# Patient Record
Sex: FEMALE | Race: WHITE | Employment: FULL TIME | URBAN - METROPOLITAN AREA
[De-identification: names, ages, dates, MRNs, and addresses within clinical notes are randomized per-mention and may not be internally consistent; named-entity substitution may affect disease eponyms.]

---

## 2022-03-24 PROBLEM — E03.9 HYPOTHYROIDISM AFFECTING PREGNANCY IN THIRD TRIMESTER: Status: ACTIVE | Noted: 2022-03-24

## 2022-03-24 PROBLEM — Z37.9 NORMAL LABOR: Status: ACTIVE | Noted: 2022-03-24

## 2022-03-24 PROBLEM — O09.523 ADVANCED MATERNAL AGE IN MULTIGRAVIDA, THIRD TRIMESTER: Status: ACTIVE | Noted: 2022-03-24

## 2022-03-24 PROBLEM — O99.283 HYPOTHYROIDISM AFFECTING PREGNANCY IN THIRD TRIMESTER: Status: ACTIVE | Noted: 2022-03-24

## 2022-10-26 ENCOUNTER — HOSPITAL ENCOUNTER (OUTPATIENT)
Age: 37
Setting detail: OUTPATIENT SURGERY
Discharge: HOME | End: 2022-10-26
Attending: INTERNAL MEDICINE | Admitting: INTERNAL MEDICINE
Payer: COMMERCIAL

## 2022-10-26 VITALS
TEMPERATURE: 97.1 F | HEART RATE: 68 BPM | SYSTOLIC BLOOD PRESSURE: 98 MMHG | OXYGEN SATURATION: 96 % | RESPIRATION RATE: 18 BRPM | DIASTOLIC BLOOD PRESSURE: 60 MMHG

## 2022-10-26 PROCEDURE — 76040000007: Performed by: INTERNAL MEDICINE

## 2022-10-26 PROCEDURE — 2709999900 HC NON-CHARGEABLE SUPPLY: Performed by: INTERNAL MEDICINE

## 2022-10-26 NOTE — H&P
History and Physical reviewed; I have examined the patient and there are no pertinent changes. Carver Frankel, MD, MD   2:21 PM 10/26/2022  [facility], [address] [place] [address]  [email or web address] [email or web address] [facility]

## 2022-11-07 NOTE — PROCEDURES
HIGH RESOLUTION ANORECTAL MANOMETRY REPORT      Indication:   1. Constipation K 59.0    Date of procedure: 10/26/2022    Procedure:     After confirmation of potential allergies, the anorectal manometry probe was introduced through the anus. Pressure bands were observed on the color contour. Patient was assisted to the coirrect position and catheter was stabilized by taping to the perianal skin. Patient was encouraged to relax while acclimating to catheter for a few minutes. Standard protocol for the procedure was followed. At the conclusion of the procedure; the catheter was removed. Findings:    1. Resting pressure: Resting pressure was normal at 58 mm Hg (normal is  mm Hg)    2. Squeeze pressure: There was adequate rise in pressures with attempted squeeze. Duration of sustained squeeze is sustained (normal response is a rise to  ~ 100 above baseline)    3. Bear down: Dyssynergic defecation was noted on both attempts with a less than 20% drop in anal pressures. Rectal pressures were adequate. 4. RAIR (Recto-anal inhibitory reflex): present. 5. Rectal sensation: normal rectal sensation (first sensation at 60 mm Hg, urge to defecate at 60 Hg and discomfort at 100 Hg) was observed. Impression:    1. Dyssynergic defecation. 2. Otherwise normal study. Recommend:    1. Consider biofeedback therapy. Moises Rivera MD  [facility].  [email or web address]  Phone: [phone]  Pager: [phone]  Cell: [phone]. [email or web address]

## 2022-11-15 ENCOUNTER — TRANSCRIBE ORDER (OUTPATIENT)
Dept: SCHEDULING | Age: 37
End: 2022-11-15

## 2022-11-30 ENCOUNTER — TRANSCRIBE ORDER (OUTPATIENT)
Dept: SCHEDULING | Age: 37
End: 2022-11-30

## 2022-11-30 DIAGNOSIS — K59.00 CONSTIPATION: Primary | ICD-10-CM

## 2022-12-07 ENCOUNTER — HOSPITAL ENCOUNTER (OUTPATIENT)
Dept: PHYSICAL THERAPY | Age: 37
Discharge: HOME | End: 2022-12-07
Payer: COMMERCIAL

## 2022-12-07 PROCEDURE — 97530 THERAPEUTIC ACTIVITIES: CPT

## 2022-12-07 PROCEDURE — 97162 PT EVAL MOD COMPLEX 30 MIN: CPT

## 2022-12-07 NOTE — THERAPY EVALUATION
[facility] [employer]  [address] [facility]  [phone] fax    Plan of Care/ Statement of Necessity for Physical Therapy Services    Patient name: Gonzalo Polk Start of Care: 2022   Referral source: Citlali Lopez NP : 1985    Medical Diagnosis: Other specified disorders of muscle [M62.89]  Payor: BLUE CROSS / Plan:  [facility] / Product Type: PPO /  Onset Date: 2022    Treatment Diagnosis: PFD, difficulty with voiding, UI   Prior Hospitalization: see medical history Provider#: [id number]   Medications: Verified on Patient summary List    Comorbidities: scoliosis surgery, thyroid problems, alcohol use, visual impaired,    Prior Level of Function: chronic back pain, mod ind with mobility, no UI symptoms nor bowel problem           The Plan of Care and following information is based on the information from the initial evaluation. Assessment/ varela information: Ms. Kelly Otoole is a 43 y/o, F who present with c/o PFD, difficulty voiding, UI. Pt had problem with epidural & had   in 2022. Sep MRI show arachnoiditis. She had urodynamic & manometry tests; recalls PFM weakness with all tests. She's also scheduled for another pelvic floor MRI and will follow up with MD for results next week. Pt reports very mild urine leakage with coughing, less than 1x/month. Pt requires double voiding (but notes only small amount with 2nd voiding), straining significantly to void and persistent pressure/urgency with bladder. Nocturia is 3x/night. Bowel dysfunction include max straining, hemorrhoid (internal & external) and incomplete voiding. Pt sometimes experiences fecal leakage while pushing to void bladder. Pt hasn't been sexually active since ; no problem with internal assessment/Pap smear. Besides, pt has chronic LBP due to scoliosis surgery.    Evaluation reveals patient with fair strength of B obliques (worst with right side plank) & mod diastasis recti (3 finger at umbilicus but good ligamentous tension). Internal assessment reveals poor tone of PFM, fair strength and endurance (PERF score is 1+/7/4//4). Noted intact anal reflex, fairly good endurance for rectal sphincter/PFM (20 sec hold). Patient may benefit from physical therapy to address these limitations to improve her QOL. Evaluation Complexity History HIGH Complexity :3+ comorbidities / personal factors will impact the outcome/ POC ; Examination MEDIUM Complexity : 3 Standardized tests and measures addressing body structure, function, activity limitation and / or participation in recreation  ;Presentation MEDIUM Complexity : Evolving with changing characteristics  ; Clinical Decision Making MEDIUM Complexity : FOTO score of 26-74  Overall Complexity Rating: MEDIUM    Problem List: Pelvic pain/dysfunction, Decreased pelvic floor mm awareness, Decreased pelvic floor mm strength, Use of accessory muscles, Improper voiding habits, Hypertonus of pelvic floor, and Urinary urgency    Treatment Plan may include any combination of the following:   Therapeutic exercise, Urge suppression techniques, Neuromuscular re-education, Manual therapy, Physical agent/modality, and Patient education  Patient / Family readiness to learn indicated by: asking questions, trying to perform skills, and interest    Persons(s) to be included in education: patient (P)    Barriers to Learning/Limitations: None    Patient Goal (s): empty bowel & bladder better, improve strength    Patient Self Reported Health Status: good    Rehabilitation Potential: good      Short Term Goals: To be accomplished in 5  weeks:  1. Patient will perform Home Exercise Program accurately as adjunct to PT clinic visits to promote healthy lifestyle and improve quality of life. Eval status: will review next visits     2.  Patient will demonstrate accurate simulation of proper defecation dynamics with min cues for increased ease of defecation and more normal function. Eval status: will review next visits    3. Patient will report at least 25% improvement with emptying bladder & bowel to improve her QOL. Eval status: max straining, incomplete emptying, persistent pressure/urgency     Long Term Goals: To be accomplished in 8 weeks:  1. Patient demonstrate independence in HEP for maintenance of Pelvic Floor program and improved quality of life. Eval status: will review next visits     2. Patient will report at least 60% improvement with emptying bladder & bowel to improve her QOL. Eval status: max straining, incomplete emptying, persistent pressure/urgency     3. Patient will demonstrate improvement of PFM strength at least 1/5 grade to improve ease with voiding and ADLs performance. Eval status: 1+/5     4. Patient will be able to perform side plank for 30 seconds with good form indicating improved core strength for voiding & ADLs performance. Eval status: 12 sec with right plank, 16 with Left side     4. Patient will have FOTO score Urinary Problem & Bowel Constipation change of 4 & 11 points indicating improvement in function. Eval status: 79 & 45      Frequency / Duration: Patient to be seen 1-2 times per week for 8 weeks. Patient/ Caregiver education and instruction: Diagnosis, prognosis, Proper Voiding Habits, Diet, Pain Management, Exercises, and Bladder Retraining      Balaji Cruz 12/7/2022 9:37 AM    ________________________________________________________________________    I certify that the above Therapy Services are being furnished while the patient is under my care. I agree with the treatment plan and certify that this therapy is necessary.     [de-identified] Signature:____________Date:_________TIME:________     Luther Burt NP  ** Signature, Date and Time must be completed for valid certification **      Please sign and return to [facility] - [address] [facility]  [phone] fax

## 2022-12-07 NOTE — THERAPY EVALUATION
PF Daily Treatment Note  Patient Name: Sawyer Grady  Date:2022  [x]  Patient  Verified  Insurance:Payor: BLUE CROSS / Plan: [employer] [facility] / Product Type: PPO /   In time: 4:56  Out time: 6:02  Total Treatment Time (min): 66  Total Timed Codes (min): 40  1:1 Treatment Time ( only): 66   Visit #: 1 of 8-16    Treatment Area: [x] Pelvic Floor     [] Other:    SUBJECTIVE  Pain Level (0-10 scale): 2/10  Any medication changes, allergies to medications, adverse drug reactions, diagnosis change, or new procedure performed?: [x] No    [] Yes (see summary sheet for update)    Ms. Frankie Schwarz is a 45 y/o, F who present with c/o PFD, difficulty voiding, UI. Pt had problem with epidural & had   in 2022. Sep MRI show arachnoiditis. She had urodynamic & manometry tests; recalls PFM weakness with all tests. She's also scheduled for another pelvic floor MRI and will follow up with MD for results next week. Pt reports very mild urine leakage with coughing, less than 1x/month. Pt requires double voiding (but notes only small amount with 2nd voiding), straining significantly to void and persistent pressure/urgency with bladder. Nocturia is3x/night. Bowel dysfunction include max straining, hemorrhoid (internal & external) and incomplete voiding. Pt sometimes experiences fecal leakage while pushing to void bladder. Pt hasn't been sexually active since ; no problem with internal assessment/Pap smear. Besides, pt has chronic LBP due to scoliosis surgery.          Pelvic Floor Dysfunction Evaluation    Musculoskeletal Screen:    Skin Integrity:  [] Healthy [x] Red  [] Labia Atrophy [] Fragile    Sensation: [x] Intact [] Diminished:    Muscle Bulk: [x] Symmetrical  [] Well-developed [] Atrophied:  []L   []R   []B    Prolapse: Min instability with ant wall  [] Cystocele:   [] Rectocele:    PERF Score (Performance/Endurance/Repetitions/Flicks)   P: 1+ E:7 R:4 F:4 Total:  More prominent contraction of Right side than Left    Rectally: 20 sec hold  Good coordination with bulging    Patient has failed previous pelvic floor muscle training? [] Yes    [] No    EMG Evaluation:  [] N/A [] Deferred secondary to:    Channel A: Electrode type:  [] Internal    [] Surface    [] Vaginal    [] Rectal  Channel B: Electrode location:    Baseline Resting Tone (1 minute)  Channel A (microvolts): Quality:  [] Normal [] Irradic [] Elevated  Channel B (microvolts): Slow Twitch: (10 second hold, 20 second rest)  Channel A (microvolts): Quality:[] Quick/slow rise [] Low net rise  Net rise (microvolts):   [] Slow Relaxation [] Incoordination       [] Unable to contract [] Fatigues at (sec):       [] Elevated baseline between contractions    Channel B (microvolts): Use of Accessory Muscles: [] Minimal  Net rise (microvolts):   [] Moderate  [] Excessive       [] No use of accessory muscles    Fast Twitch (3 second hold, 10 second rest)  Channel A (microvolts): Quality:[] Quick/slow rise [] Low net rise  Net rise (microvolts):   [] Slow Relaxation [] Incoordination       [] Unable to contract [] Fatigues at (sec):       [] Elevated baseline between contractions    Channel B (microvolts):  Use of Accessory Muscles: [] Minimal  Net rise (microvolts):   [] Moderate  [] Excessive       [] No use of accessory muscles    Optional Tests:  Lower abdominal strength: /5    Comments/Additional Tests:      OBJECTIVE    26 min eval    40 min Therapeutic Activity:  []  See flow sheet :Pt edu within scope of practice on prognosis, POC, PFM anatomy/physiology, Pelvic Floor PT, voiding mechanics, HEP    Rationale: Increase pelvic floor muscle strength, Improve quality of pelvic floor contractions, Decrease resting tone of the pelvic floor, Increase tissue extensibility of the pelvic floor muscles, Increase core strength, Inhibit abnormal muscle activity, and Improve lumbosacral and coccygeal mobility in order to Increase urinary continence, Improve frequency and ease of bowel movements, Improve ability to perform ADLs, and voiding bladder with eaes . min Patient Education: [x] Review HEP    [] Progressed/Changed HEP based on:   [] positioning   [] body mechanics   [] transfers   [] heat/ice application        Other Objective/Functional Measures:   []baseline resting tone: []slow twitch mms   []fast twitch mms      Pain Level (0-10 scale) post treatment: 2/10    ASSESSMENT/Changes in Function: see POC please    []  Decrease # of leaks   [] No change []  Improving [] Resolved     []  Decrease hypertonus [] No change []  Improving [] Resolved     []  Increase void interval [] No change []  Improving [] Resolved     []  Increase PF strength [] No change []  Improving [] Resolved     []  Increase PF endurance [] No change []  Improving [] Resolved     []  Increase endurance [] No change []  Improving [] Resolved     []  Decrease # of pads [] No change []  Improving [] Resolved     []  Decrease pain [] No change []  Improving [] Resolved       Patient will continue to benefit from skilled PT services to modify and progress therapeutic interventions, address functional mobility deficits, address ROM deficits, address strength deficits, analyze and address soft tissue restrictions, analyze and cue movement patterns, analyze and modify body mechanics/ergonomics, assess and modify postural abnormalities, address imbalance/dizziness and instruct in home and community integration to attain remaining goals.      [x]  See Plan of Care         PLAN  [x]  Upgrade activities as tolerated     [x]  Continue plan of care  []  Update interventions per flow sheet       []  Discharge due to:_  []  Other:_        Vish Carpenter 12/7/2022  9:37 AM

## 2023-01-04 ENCOUNTER — HOSPITAL ENCOUNTER (OUTPATIENT)
Dept: PHYSICAL THERAPY | Age: 38
Discharge: HOME | End: 2023-01-04
Payer: COMMERCIAL

## 2023-01-04 PROCEDURE — 97530 THERAPEUTIC ACTIVITIES: CPT

## 2023-01-04 PROCEDURE — 97112 NEUROMUSCULAR REEDUCATION: CPT

## 2023-01-04 NOTE — PROGRESS NOTES
PF DAILY TREATMENT NOTE 3-16    Patient Name: Williams Precise  Date:2023  : 1985  [x]  Patient  Verified  Payor: BLUE CROSS / Plan: [employer] [facility] [address] / Product Type: PPO /    In time:4:02  Out time: 4:54  Total Treatment Time (min): 52  Visit #: 1 of     Medicare/BCBS Only   Total Timed Codes (min):  52 1:1 Treatment Time:  52     Treatment Area: [x] Pelvic Floor     [] Other:    SUBJECTIVE  Pain Level (0-10 scale): 2/10 back  Any medication changes, allergies to medications, adverse drug reactions, diagnosis change, or new procedure performed?: [x] No    [] Yes (see summary sheet for update)  Subjective functional status/changes:   [] No changes reported  Pt reports doing ok with all the handouts. Pt had MRI during voiding with contrast. Pt's seeing fecal matter bulging sideway instead of straight down towards the anus. No prolapse diagnosis. Her doctor will a kidney screen/blood test to make sure she's ok. But she usually drinks about a gallon of water everyday. OBJECTIVE    27 min Therapeutic Activity:  [x]  See flow sheet :    []  Increase Tissue extensibility        []  Assess fiber intake    [x]  Assess voiding habits  [x]  Assess bowel habits  []  Other:   Rationale: Increase pelvic floor muscle strength, Improve quality of pelvic floor contractions, Decrease resting tone of the pelvic floor, Increase tissue extensibility of the pelvic floor muscles, Increase core strength, Inhibit abnormal muscle activity, and Improve lumbosacral and coccygeal mobility in order to Increase urinary continence, Decrease bowel urgency, Improve frequency and ease of bowel movements, and Improve ability to perform ADLs.     25 min Neuromuscular Re-education:  []  See flow sheet :   []  Pelvic floor strengthening                 []  Pelvic floor downtraining  [x]  Quality pelvic floor contractions       []  Relaxation techniques  []  Urge suppression exercises  [x]  Other: HEP  Rationale: Increase pelvic floor muscle strength, Improve quality of pelvic floor contractions, Decrease resting tone of the pelvic floor, Increase tissue extensibility of the pelvic floor muscles, Increase core strength, Inhibit abnormal muscle activity, and Improve lumbosacral and coccygeal mobility in order to Increase urinary continence, Decrease bowel urgency, Improve frequency and ease of bowel movements, and Improve ability to perform ADLs.             With   [] TE   [] TA   [] neuro  [] manual   [] other: Patient Education: [x] Review HEP    [] Progressed/Changed HEP based on:   [] positioning   [] body mechanics   [] transfers   [] heat/ice application    [] other:      Other Objective/Functional Measures:   []baseline resting tone:   []slow twitch mms   []fast twitch mms    Pain Level (0-10 scale) post treatment: 2/10 back    ASSESSMENT/Changes in Function: see progress note please    []  Decrease # of leaks   [] No change []  Improving [] Resolved     []  Decrease hypertonus [] No change []  Improving [] Resolved     []  Increase void interval [] No change []  Improving [] Resolved     []  Increase PF strength [] No change []  Improving [] Resolved     []  Increase PF endurance [] No change []  Improving [] Resolved     []  Increase endurance [] No change []  Improving [] Resolved     []  Decrease # of pads [] No change []  Improving [] Resolved     []  Decrease pain [] No change []  Improving [] Resolved     []  Increased coordination [] No change []  Improving [] Resolved     []  Increased Bowel Frequency [] No change []  Improving [] Resolved       Patient will continue to benefit from skilled PT services to modify and progress therapeutic interventions, address functional mobility deficits, address ROM deficits, address strength deficits, analyze and address soft tissue restrictions, analyze and cue movement patterns, analyze and modify body mechanics/ergonomics, assess and modify postural abnormalities, address imbalance/dizziness, and instruct in home and community integration to attain remaining goals. [x]  See Plan of Care  [x]  See progress note/recertification  []  See Discharge Summary         Progress towards goals / Updated goals:  Short Term Goals: To be accomplished in 5  weeks:  1. Patient will perform Home Exercise Program accurately as adjunct to PT clinic visits to promote healthy lifestyle and improve quality of life. Eval status: will review next visits  Current: partial compliance 1-4-23    2. Patient will demonstrate accurate simulation of proper defecation dynamics with min cues for increased ease of defecation and more normal function. Eval status: will review next visits  Current: progressing gradually 1-4-23    3. Patient will report at least 25% improvement with emptying bladder & bowel to improve her QOL. Eval status: max straining, incomplete emptying, persistent pressure/urgency  Current: progressing well 1-4-23    Long Term Goals: To be accomplished in 8 weeks:  1. Patient demonstrate independence in HEP for maintenance of Pelvic Floor program and improved quality of life. Eval status: will review next visits  Current: partial compliance 1-4-23    2. Patient will report at least 60% improvement with emptying bladder & bowel to improve her QOL. Eval status: max straining, incomplete emptying, persistent pressure/urgency  Current: progressing gradually 1-4-23    3. Patient will demonstrate improvement of PFM strength at least 1/5 grade to improve ease with voiding and ADLs performance. Eval status: 1+/5  Current: progressing gradually 1-4-23    4. Patient will be able to perform side plank for 30 seconds with good form indicating improved core strength for voiding & ADLs performance. Eval status: 12 sec with right plank, 16 with Left side  Current: not met 1-4-23    4.  Patient will have FOTO score Urinary Problem & Bowel Constipation change of 4 & 11 points indicating improvement in function.   Eval status: 79 & 45  Current: met with Urinary Problem (improved 19 points), progressing with Bowel constipation (improved 7 points) 1-4-23      PLAN  [x]  Upgrade activities as tolerated     [x]  Continue plan of care  []  Update interventions per flow sheet       []  Discharge due to:_  []  Other:_      John Ayalaless 1/4/2023  10:16 AM    Future Appointments   Date Time Provider Susanna Rao   1/4/2023  4:00 PM Talon Dills [id number] [facility]   1/11/2023  4:00 PM Talon Dills [id number] [facility]   1/18/2023  4:00 PM Talon Dills [id number] [facility]   1/20/2023 10:00 AM Talon Dills [id number] [facility]   1/25/2023  4:00 PM Talon Dills [id number] [facility]   1/27/2023 12:00 PM Talon Dills [id number] [facility]

## 2023-01-04 NOTE — PROGRESS NOTES
[facility] - Yeimi Chang  [address] [facility]  [phone] fax    Pelvic Floor Progress Note    Patient name: Suraj Coker Start of Care: 2022   Referral source: Eduard Gunn NP : 1985               Medical Diagnosis: Other specified disorders of muscle [M62.89]  Payor: BLUE CROSS / Plan: [facility] / Product Type: PPO /  Onset Date: 2022               Treatment Diagnosis: PFD, difficulty with voiding, UI   Prior Hospitalization: see medical history Provider#: [id number]   Medications: Verified on Patient summary List    Comorbidities: scoliosis surgery, thyroid problems, alcohol use, visual impaired,    Prior Level of Function: chronic back pain, mod ind with mobility, no UI symptoms nor bowel problem              Visits from Start of Care:  2    Missed Visits: 0    Established Goals:           Excellent Good         Limited           None  [x] Increase Pelvic MM strength []  []  []  [x]  [] Decrease Pelvic MM hypertonus []  []  []  []  [] Decrease Incontinence Episodes []  []  []  []   [x] Improve Voiding Habits  []  []  [x]  []  [x] Decreased Urgency   []  [x]  []  []    Key Functional Changes: improving management for urinary urgency, improving understanding with bowel voiding mechanics. Updated Goals: to be achieved in 8 weeks:  Short Term Goals: To be accomplished in 5  weeks:  1. Patient will perform Home Exercise Program accurately as adjunct to PT clinic visits to promote healthy lifestyle and improve quality of life. Status at Progress Note: partial compliance 23     2. Patient will demonstrate accurate simulation of proper defecation dynamics with min cues for increased ease of defecation and more normal function. Status at Progress Note: progressing gradually 23     3. Patient will report at least 25% improvement with emptying bladder & bowel to improve her QOL.   Status at Progress Note: max straining, incomplete emptying, persistent pressure/urgency; progressing well 1-4-23     Long Term Goals: To be accomplished in 8 weeks:  1. Patient demonstrate independence in HEP for maintenance of Pelvic Floor program and improved quality of life. Status at Progress Note: partial compliance 1-4-23     2. Patient will report at least 60% improvement with emptying bladder & bowel to improve her QOL. Status at Progress Note: max straining, incomplete emptying, persistent pressure/urgency; progressing gradually 1-4-23     3. Patient will demonstrate improvement of PFM strength at least 1/5 grade to improve ease with voiding and ADLs performance. Status at Progress Note: 1+/5; progressing gradually 1-4-23     4. Patient will be able to perform side plank for 30 seconds with good form indicating improved core strength for voiding & ADLs performance. Status at Progress Note: 12 sec with right plank, 16 with Left side; not met, 2nd visit today 1-4-23     4. Patient will have FOTO score for Bowel Constipation change of 4 & 11 points indicating improvement in function. Status at Progress Note: progressing with Bowel constipation (improved 7 points) 1-4-23       ASSESSMENT/RECOMMENDATIONS: Pt had 1 month gap since eval due to schedule & other testing. She reports that MRI defecography shows bulging of of end of rectum while she's moving her bowel. She's able to reposition fecal matter after performing some PFM contraction. She verbalized good understanding with voiding posture & mechanics. Pt demonstrates improving awareness of PFM & self management with urinary urgency.  Pt will cont to benefit from skilled Pelvic Floor PT to Increase pelvic floor muscle strength, Improve quality of pelvic floor contractions, Decrease resting tone of the pelvic floor, Increase tissue extensibility of the pelvic floor muscles, Increase core strength, Inhibit abnormal muscle activity, and Improve lumbosacral and coccygeal mobility in order to Increase urinary continence, Decrease bowel urgency, Improve frequency and ease of bowel movements, and Improve ability to perform ADLs. [x]Continue therapy per initial plan/protocol at a frequency of  1-2 x per week for 8 weeks  []Continue therapy with the following recommended changes:_____________________      _____________________________________________________________________  []Discontinue therapy progressing towards or have reached established goals  []Discontinue therapy due to lack of appreciable progress towards goals  []Discontinue therapy due to lack of attendance or compliance  []Await Physician's recommendations/decisions regarding therapy  []Other:________________________________________________________________    Thank you for this referral.   Nancy Murphy 1/4/2023 10:17 AM  NOTE TO PHYSICIAN:  PLEASE COMPLETE THE ORDERS BELOW AND   FAX TO [facility]: [phone]  If you are unable to process this request in 24 hours please contact our office: [phone]    I have read the above report and request that my patient continue as recommended. I have read the above report and request that my patient continue therapy with the following changes/special instructions:___________________________________________________________  I have read the above report and request that my patient be discharged from therapy.     [de-identified] Signature:____________Date:_________TIME:________     Regan Rios NP  ** Signature, Date and Time must be completed for valid certification **

## 2023-01-11 ENCOUNTER — HOSPITAL ENCOUNTER (OUTPATIENT)
Dept: PHYSICAL THERAPY | Age: 38
Discharge: HOME | End: 2023-01-11
Payer: COMMERCIAL

## 2023-01-11 PROCEDURE — 97140 MANUAL THERAPY 1/> REGIONS: CPT

## 2023-01-11 PROCEDURE — 97112 NEUROMUSCULAR REEDUCATION: CPT

## 2023-01-11 PROCEDURE — 97530 THERAPEUTIC ACTIVITIES: CPT

## 2023-01-11 NOTE — PROGRESS NOTES
PF DAILY TREATMENT NOTE 3-16    Patient Name: Jhony Hidalgo  Date:2023  : 1985  [x]  Patient  Verified  Payor: BLUE CROSS [facility] / Product Type: PPO /    In time: 4:10  Out time: 5:02  Total Treatment Time (min): 52  Visit #: 1 of     Medicare/BCBS Only   Total Timed Codes (min):  52 1:1 Treatment Time:  52     Treatment Area: [x] Pelvic Floor     [] Other:    SUBJECTIVE  Pain Level (0-10 scale): 0/10  Any medication changes, allergies to medications, adverse drug reactions, diagnosis change, or new procedure performed?: [x] No    [] Yes (see summary sheet for update)  Subjective functional status/changes:   [] No changes reported  Pt reports feeding her son thus being late. She has no questions with HEP. OBJECTIVE    15 min Therapeutic Activity:  []  See flow sheet :    []  Increase Tissue extensibility        []  Assess fiber intake    [x]  Assess voiding habits  [x]  Assess bowel habits  []  Other:   Rationale: Increase pelvic floor muscle strength, Improve quality of pelvic floor contractions, Decrease resting tone of the pelvic floor, Increase tissue extensibility of the pelvic floor muscles, Increase core strength, Inhibit abnormal muscle activity, and Improve lumbosacral and coccygeal mobility in order to Improve frequency and ease of bowel movements and Improve ability to perform ADLs.       27 min Neuromuscular Re-education:  []  See flow sheet :   []  Pelvic floor strengthening                 []  Pelvic floor downtraining  [x]  Quality pelvic floor contractions       [x]  Relaxation techniques  []  Urge suppression exercises  []  Other:  Rationale: Increase pelvic floor muscle strength, Improve quality of pelvic floor contractions, Decrease resting tone of the pelvic floor, Increase tissue extensibility of the pelvic floor muscles, Increase core strength, Inhibit abnormal muscle activity, and Improve lumbosacral and coccygeal mobility in order to Improve frequency and ease of bowel movements and Improve ability to perform ADLs. 10 min Manual Therapy:  abdominal MFR,  scar massage/mobilization, colon stimulation   The manual therapy interventions were performed at a separate and distinct time from the therapeutic activities interventions. Rationale: Decrease resting tone of the pelvic floor, Increase tissue extensibility of the pelvic floor muscles, Inhibit abnormal muscle activity, and Improve lumbosacral and coccygeal mobility in order to Improve frequency and ease of bowel movements and Improve ability to perform ADLs. With   [] TE   [] TA   [] neuro  [] manual   [] other: Patient Education: [x] Review HEP    [] Progressed/Changed HEP based on:   [] positioning   [] body mechanics   [] transfers   [] heat/ice application    [] other:      Other Objective/Functional Measures:   []baseline resting tone:   []slow twitch mms   []fast twitch mms    Pain Level (0-10 scale) post treatment: 0/10    ASSESSMENT/Changes in Function: Pt demonstrates good form with all therex. Will progress with Biofeedback next visit.     []  Decrease # of leaks   [] No change []  Improving [] Resolved     []  Decrease hypertonus [] No change []  Improving [] Resolved     []  Increase void interval [] No change []  Improving [] Resolved     []  Increase PF strength [] No change []  Improving [] Resolved     []  Increase PF endurance [] No change []  Improving [] Resolved     []  Increase endurance [] No change []  Improving [] Resolved     []  Decrease # of pads [] No change []  Improving [] Resolved     []  Decrease pain [] No change []  Improving [] Resolved     []  Increased coordination [] No change []  Improving [] Resolved     []  Increased Bowel Frequency [] No change []  Improving [] Resolved       Patient will continue to benefit from skilled PT services to modify and progress therapeutic interventions, address functional mobility deficits, address ROM deficits, address strength deficits, analyze and address soft tissue restrictions, analyze and cue movement patterns, analyze and modify body mechanics/ergonomics, assess and modify postural abnormalities, address imbalance/dizziness, and instruct in home and community integration to attain remaining goals. []  See Plan of Care  [x]  See progress note/recertification  []  See Discharge Summary           Progress towards goals / Updated goals:  Updated Goals: to be achieved in 8 weeks:  Short Term Goals: To be accomplished in 5  weeks:  1. Patient will perform Home Exercise Program accurately as adjunct to PT clinic visits to promote healthy lifestyle and improve quality of life. Status at Progress Note: partial compliance 1-4-23  Current: good compliance 1-11-23     2. Patient will demonstrate accurate simulation of proper defecation dynamics with min cues for increased ease of defecation and more normal function. Status at Progress Note: progressing gradually 1-4-23     3. Patient will report at least 25% improvement with emptying bladder & bowel to improve her QOL. Status at Progress Note: max straining, incomplete emptying, persistent pressure/urgency; progressing well 1-4-23       Long Term Goals: To be accomplished in 8 weeks:  1. Patient demonstrate independence in HEP for maintenance of Pelvic Floor program and improved quality of life. Status at Progress Note: partial compliance 1-4-23     2. Patient will report at least 60% improvement with emptying bladder & bowel to improve her QOL. Status at Progress Note: max straining, incomplete emptying, persistent pressure/urgency; progressing gradually 1-4-23     3. Patient will demonstrate improvement of PFM strength at least 1/5 grade to improve ease with voiding and ADLs performance. Status at Progress Note: 1+/5; progressing gradually 1-4-23     4.  Patient will be able to perform side plank for 30 seconds with good form indicating improved core strength for voiding & ADLs performance. Status at Progress Note: 12 sec with right plank, 16 with Left side; not met, 2nd visit today 1-4-23     4. Patient will have FOTO score for Bowel Constipation change of 4 & 11 points indicating improvement in function.   Status at Progress Note: progressing with Bowel constipation (improved 7 points) 1-4-23      PLAN  [x]  Upgrade activities as tolerated     [x]  Continue plan of care  []  Update interventions per flow sheet       []  Discharge due to:_  []  Other:_      Ollen Page 1/11/2023  10:26 AM    Future Appointments   Date Time Provider Susanna Rao   1/11/2023  4:00 PM Marthena Castleman [id number] [facility]   1/18/2023  4:00 PM Marthena Castleman [id number] [facility]   1/20/2023 10:00 AM Marthena Castleman [id number] [facility]   1/25/2023  4:00 PM Marthena Castleman [id number] [facility]   1/27/2023 12:00 PM Marthena Castleman [id number] [facility]

## 2023-01-18 ENCOUNTER — HOSPITAL ENCOUNTER (OUTPATIENT)
Dept: PHYSICAL THERAPY | Age: 38
Discharge: HOME | End: 2023-01-18
Payer: COMMERCIAL

## 2023-01-18 PROCEDURE — 97140 MANUAL THERAPY 1/> REGIONS: CPT

## 2023-01-18 PROCEDURE — 97112 NEUROMUSCULAR REEDUCATION: CPT

## 2023-01-18 PROCEDURE — 97530 THERAPEUTIC ACTIVITIES: CPT

## 2023-01-18 PROCEDURE — 97110 THERAPEUTIC EXERCISES: CPT

## 2023-01-18 NOTE — PROGRESS NOTES
PF DAILY TREATMENT NOTE 3-16    Patient Name: Juventino Carlos  Date:2023  : 1985  [x]  Patient  Verified  Payor: BLUE CROSS / Plan: [employer] [facility] / Product Type: PPO /    In time: 4:05  Out time: 4:58  Total Treatment Time (min): 53  Visit #: 2 of     Medicare/BCBS Only   Total Timed Codes (min):  53 1:1 Treatment Time:  53     Treatment Area: [x] Pelvic Floor     [] Other:    SUBJECTIVE  Pain Level (0-10 scale):  0/10  Any medication changes, allergies to medications, adverse drug reactions, diagnosis change, or new procedure performed?: [x] No    [] Yes (see summary sheet for update)  Subjective functional status/changes:   [] No changes reported  Pt reports lower back pain on the right, intermittent pain. Her menstrual cycles just starts. OBJECTIVE    27 min Therapeutic Exercise:  [x] See flow sheet :   [x]  Pelvic floor strengthening                 []  Pelvic floor downtraining  []  Quality pelvic floor contractions       [x]  Relaxation techniques  []  Urge suppression exercises  [x]  Other: core & hips strengthening therex  Rationale: Increase pelvic floor muscle strength, Improve quality of pelvic floor contractions, Decrease resting tone of the pelvic floor, Increase tissue extensibility of the pelvic floor muscles, Increase core strength, Inhibit abnormal muscle activity, and Improve lumbosacral and coccygeal mobility in order to Improve frequency and ease of bowel movements and Improve ability to perform ADLs.       8 min Therapeutic Activity:  []  See flow sheet :    []  Increase Tissue extensibility        [x]  Assess fiber intake    [x]  Assess voiding habits  [x]  Assess bowel habits  []  Other:   Rationale:  Increase pelvic floor muscle strength, Improve quality of pelvic floor contractions, Decrease resting tone of the pelvic floor, Increase tissue extensibility of the pelvic floor muscles, Increase core strength, Inhibit abnormal muscle activity, and Improve lumbosacral and coccygeal mobility in order to Improve frequency and ease of bowel movements and Improve ability to perform ADLs. 10 min Neuromuscular Re-education:  []  See flow sheet :   []  Pelvic floor strengthening                 [x]  Pelvic floor downtraining  []  Quality pelvic floor contractions       [x]  Relaxation techniques  []  Urge suppression exercises  []  Other:  Rationale:  Increase pelvic floor muscle strength, Improve quality of pelvic floor contractions, Decrease resting tone of the pelvic floor, Increase tissue extensibility of the pelvic floor muscles, Increase core strength, Inhibit abnormal muscle activity, and Improve lumbosacral and coccygeal mobility in order to Improve frequency and ease of bowel movements and Improve ability to perform ADLs. 8 min Manual Therapy:  pelvic assessment & correction   The manual therapy interventions were performed at a separate and distinct time from the therapeutic activities interventions. Rationale: Decrease resting tone of the pelvic floor, Increase tissue extensibility of the pelvic floor muscles, Inhibit abnormal muscle activity, and Improve lumbosacral and coccygeal mobility in order to Improve frequency and ease of bowel movements and Improve ability to perform ADLs. With   [] TE   [] TA   [] neuro  [] manual   [] other: Patient Education: [x] Review HEP    [] Progressed/Changed HEP based on:   [] positioning   [] body mechanics   [] transfers   [] heat/ice application    [] other:      Other Objective/Functional Measures:   []baseline resting tone:   []slow twitch mms   []fast twitch mms   Left upslip, very min right AI & Left on Left sacral torsion   More challenged with right hip abd     Pain Level (0-10 scale) post treatment: 0/10    ASSESSMENT/Changes in Function: pt demonstrates good understanding with self MET for pelvic alignment.  Progressed strengthening therex today, mod challenged due to core & glute weakness, no significant pain reported. []  Decrease # of leaks   [] No change []  Improving [] Resolved     []  Decrease hypertonus [] No change []  Improving [] Resolved     []  Increase void interval [] No change []  Improving [] Resolved     []  Increase PF strength [] No change []  Improving [] Resolved     []  Increase PF endurance [] No change []  Improving [] Resolved     []  Increase endurance [] No change []  Improving [] Resolved     []  Decrease # of pads [] No change []  Improving [] Resolved     []  Decrease pain [] No change []  Improving [] Resolved     []  Increased coordination [] No change []  Improving [] Resolved     []  Increased Bowel Frequency [] No change []  Improving [] Resolved       Patient will continue to benefit from skilled PT services to modify and progress therapeutic interventions, address functional mobility deficits, address ROM deficits, address strength deficits, analyze and address soft tissue restrictions, analyze and cue movement patterns, analyze and modify body mechanics/ergonomics, assess and modify postural abnormalities, address imbalance/dizziness, and instruct in home and community integration to attain remaining goals. []  See Plan of Care  [x]  See progress note/recertification  []  See Discharge Summary         Progress towards goals / Updated goals:  Updated Goals: to be achieved in 8 weeks:  Short Term Goals: To be accomplished in 5  weeks:  1. Patient will perform Home Exercise Program accurately as adjunct to PT clinic visits to promote healthy lifestyle and improve quality of life. Status at Progress Note: partial compliance 1-4-23  Current: good compliance 1-11-23     2. Patient will demonstrate accurate simulation of proper defecation dynamics with min cues for increased ease of defecation and more normal function. Status at Progress Note: progressing gradually 1-4-23     3.  Patient will report at least 25% improvement with emptying bladder & bowel to improve her QOL. Status at Progress Note: max straining, incomplete emptying, persistent pressure/urgency; progressing well 1-4-23        Long Term Goals: To be accomplished in 8 weeks:  1. Patient demonstrate independence in HEP for maintenance of Pelvic Floor program and improved quality of life. Status at Progress Note: partial compliance 1-4-23     2. Patient will report at least 60% improvement with emptying bladder & bowel to improve her QOL. Status at Progress Note: max straining, incomplete emptying, persistent pressure/urgency; progressing gradually 1-4-23     3. Patient will demonstrate improvement of PFM strength at least 1/5 grade to improve ease with voiding and ADLs performance. Status at Progress Note: 1+/5; progressing gradually 1-4-23     4. Patient will be able to perform side plank for 30 seconds with good form indicating improved core strength for voiding & ADLs performance. Status at Progress Note: 12 sec with right plank, 16 with Left side; not met, 2nd visit today 1-4-23     4. Patient will have FOTO score for Bowel Constipation change of 4 & 11 points indicating improvement in function.   Status at Progress Note: progressing with Bowel constipation (improved 7 points) 1-4-23    PLAN  [x]  Upgrade activities as tolerated     [x]  Continue plan of care  []  Update interventions per flow sheet       []  Discharge due to:_  []  Other:_      Lynnwood Brunner 1/18/2023  11:08 AM    Future Appointments   Date Time Provider Susanna Rao   1/18/2023  4:00 PM Aurora Leland TORRESH [facility]   1/20/2023 10:00 AM Balaji Howard [id number] [facility]   1/25/2023  4:00 PM Aurora Leland [id number] [facility]   1/27/2023 12:00 PM Aurora Ha [id number] [facility]   2/8/2023  5:00 PM Aurora Ha MMCPTPB [facility]   2/10/2023 12:00 PM Aurora Ha MMCPTPB [facility]   2/15/2023  4:00 PM Aurora Leland [id number] [facility]   2/17/2023 12:00 PM Aurora Leland MMCPTPB [facility]   2/22/2023  4:00 PM Aurora Leland [id number] [facility]   2/24/2023 12:00 PM Kelsy Nick [id number] [facility]   3/1/2023  4:00 PM Kelsy Nick [id number] [facility]

## 2023-01-20 ENCOUNTER — HOSPITAL ENCOUNTER (OUTPATIENT)
Dept: PHYSICAL THERAPY | Age: 38
Discharge: HOME | End: 2023-01-20
Payer: COMMERCIAL

## 2023-01-20 PROCEDURE — 97530 THERAPEUTIC ACTIVITIES: CPT

## 2023-01-20 PROCEDURE — 97110 THERAPEUTIC EXERCISES: CPT

## 2023-01-20 NOTE — PROGRESS NOTES
PF DAILY TREATMENT NOTE 3-16    Patient Name: Asael Diaz  Date:2023  : 1985  [x]  Patient  Verified  Payor: BLUE CROSS / Plan: [employer] [facility] [place] / Product Type: PPO /    In time: 11:04  Out time: 11:56  Total Treatment Time (min): 52  Visit #: 3 of     Medicare/BCBS Only   Total Timed Codes (min):  52 1:1 Treatment Time:  52     Treatment Area: [x] Pelvic Floor     [] Other:    SUBJECTIVE  Pain Level (0-10 scale): 0/10  Any medication changes, allergies to medications, adverse drug reactions, diagnosis change, or new procedure performed?: [x] No    [] Yes (see summary sheet for update)  Subjective functional status/changes:   [] No changes reported  Pt reports doing ok today. No new update/questions. OBJECTIVE    44 min Therapeutic Exercise:  [x] See flow sheet :   [x]  Pelvic floor strengthening                 []  Pelvic floor downtraining  []  Quality pelvic floor contractions       [x]  Relaxation techniques  []  Urge suppression exercises  []  Other:  Rationale: Increase pelvic floor muscle strength, Improve quality of pelvic floor contractions, Decrease resting tone of the pelvic floor, Increase tissue extensibility of the pelvic floor muscles, Increase core strength, Inhibit abnormal muscle activity, and Improve lumbosacral and coccygeal mobility in order to Decrease bowel urgency, Improve frequency and ease of bowel movements, and Improve ability to perform ADLs.       8 min Therapeutic Activity:  []  See flow sheet :    []  Increase Tissue extensibility        [x]  Assess fiber intake    [x]  Assess voiding habits  [x]  Assess bowel habits  []  Other:   Rationale:  Increase pelvic floor muscle strength, Improve quality of pelvic floor contractions, Decrease resting tone of the pelvic floor, Increase tissue extensibility of the pelvic floor muscles, Increase core strength, Inhibit abnormal muscle activity, and Improve lumbosacral and coccygeal mobility in order to Decrease bowel urgency, Improve frequency and ease of bowel movements, and Improve ability to perform ADLs. With   [] TE   [] TA   [] neuro  [] manual   [] other: Patient Education: [x] Review HEP    [] Progressed/Changed HEP based on:   [] positioning   [] body mechanics   [] transfers   [] heat/ice application    [] other:      Other Objective/Functional Measures:   []baseline resting tone:   []slow twitch mms   []fast twitch mms    Pain Level (0-10 scale) post treatment: 0/10    ASSESSMENT/Changes in Function: deferred Biofeedback due to prolonged menstrual cycles. Pt demonstrates good form with all therex, min challenged with active stretching of PFM. Will progress as tolerated. []  Decrease # of leaks   [] No change []  Improving [] Resolved     []  Decrease hypertonus [] No change []  Improving [] Resolved     []  Increase void interval [] No change []  Improving [] Resolved     []  Increase PF strength [] No change []  Improving [] Resolved     []  Increase PF endurance [] No change []  Improving [] Resolved     []  Increase endurance [] No change []  Improving [] Resolved     []  Decrease # of pads [] No change []  Improving [] Resolved     []  Decrease pain [] No change []  Improving [] Resolved     []  Increased coordination [] No change []  Improving [] Resolved     []  Increased Bowel Frequency [] No change []  Improving [] Resolved       Patient will continue to benefit from skilled PT services to modify and progress therapeutic interventions, address functional mobility deficits, address ROM deficits, address strength deficits, analyze and address soft tissue restrictions, analyze and cue movement patterns, analyze and modify body mechanics/ergonomics, assess and modify postural abnormalities, address imbalance/dizziness, and instruct in home and community integration to attain remaining goals.      []  See Plan of Care  [x]  See progress note/recertification  []  See Discharge Summary         Progress towards goals / Updated goals:  Updated Goals: to be achieved in 8 weeks:  Short Term Goals: To be accomplished in 5  weeks:  1. Patient will perform Home Exercise Program accurately as adjunct to PT clinic visits to promote healthy lifestyle and improve quality of life. Status at Progress Note: partial compliance 1-4-23  Current: good compliance 1-11-23     2. Patient will demonstrate accurate simulation of proper defecation dynamics with min cues for increased ease of defecation and more normal function. Status at Progress Note: progressing gradually 1-4-23  Current: progressing slowly 1-20-23     3. Patient will report at least 25% improvement with emptying bladder & bowel to improve her QOL. Status at Progress Note: max straining, incomplete emptying, persistent pressure/urgency; progressing well 1-4-23        Long Term Goals: To be accomplished in 8 weeks:  1. Patient demonstrate independence in HEP for maintenance of Pelvic Floor program and improved quality of life. Status at Progress Note: partial compliance 1-4-23     2. Patient will report at least 60% improvement with emptying bladder & bowel to improve her QOL. Status at Progress Note: max straining, incomplete emptying, persistent pressure/urgency; progressing gradually 1-4-23     3. Patient will demonstrate improvement of PFM strength at least 1/5 grade to improve ease with voiding and ADLs performance. Status at Progress Note: 1+/5; progressing gradually 1-4-23     4. Patient will be able to perform side plank for 30 seconds with good form indicating improved core strength for voiding & ADLs performance. Status at Progress Note: 12 sec with right plank, 16 with Left side; not met, 2nd visit today 1-4-23     4. Patient will have FOTO score for Bowel Constipation change of 4 & 11 points indicating improvement in function.   Status at Progress Note: progressing with Bowel constipation (improved 7 points) 1-4-23       PLAN  [x]  Upgrade activities as tolerated     [x]  Continue plan of care  []  Update interventions per flow sheet       []  Discharge due to:_  []  Other:_      Elsa Downing 1/20/2023  7:46 AM    Future Appointments   Date Time Provider Susanna Rao   1/20/2023 11:00 AM Claytongiuseppe Dominguez [id number] [facility]   1/25/2023  4:00 PM Claytongiuseppe Dominguez IOURAVB [facility]   1/27/2023 12:00 PM Claytongiuseppe Dominguez BDRTLIS [facility]   2/8/2023  5:00 PM Claytongiuseppe Dominguez UXELEYR [facility]   2/10/2023 12:00 PM Claytongiuseppe Dominguez MMCPTPB [facility]   2/15/2023  4:00 PM Claytongiuseppe Dominguez LDMRMQF [facility]   2/17/2023 12:00 PM Claytongiuseppe Dominguez MMCPTPB [facility]   2/22/2023  4:00 PM Claytongiuseppe Dominguez PVIZGTN [facility]   2/24/2023 12:00 PM Claytongiuseppe Dominguez MMCPTPB [facility]   3/1/2023  4:00 PM Clayton [place] MMCPTPB [facility]

## 2023-01-25 ENCOUNTER — HOSPITAL ENCOUNTER (OUTPATIENT)
Dept: PHYSICAL THERAPY | Age: 38
Discharge: HOME | End: 2023-01-25
Payer: COMMERCIAL

## 2023-01-25 PROCEDURE — 97112 NEUROMUSCULAR REEDUCATION: CPT

## 2023-01-25 PROCEDURE — 97530 THERAPEUTIC ACTIVITIES: CPT

## 2023-01-25 PROCEDURE — 97110 THERAPEUTIC EXERCISES: CPT

## 2023-01-25 NOTE — PROGRESS NOTES
PF DAILY TREATMENT NOTE 3-16    Patient Name: Williams Precise  Date:2023   : 1985  [x]  Patient  Verified  Payor: BLUE CROSS / Plan: [employer] [facility] [place] / Product Type: PPO /    In time: 4:00  Out time: 4:55  Total Treatment Time (min): 55  Visit #: 4 of     Medicare/BCBS Only   Total Timed Codes (min):  55 1:1 Treatment Time:  55     Treatment Area: [x] Pelvic Floor     [] Other:    SUBJECTIVE  Pain Level (0-10 scale): 0/10  Any medication changes, allergies to medications, adverse drug reactions, diagnosis change, or new procedure performed?: [x] No    [] Yes (see summary sheet for update)  Subjective functional status/changes:   [] No changes reported  Pt reports feeling a little more relaxing and less straining during voiding both bowel & bladder. OBJECTIVE    15 min Therapeutic Exercise:  [x] See flow sheet :   [x]  Pelvic floor strengthening                 []  Pelvic floor downtraining  []  Quality pelvic floor contractions       []  Relaxation techniques  []  Urge suppression exercises  [x]  Other: core & hips strengthening  Rationale: Increase pelvic floor muscle strength, Improve quality of pelvic floor contractions, Decrease resting tone of the pelvic floor, Increase tissue extensibility of the pelvic floor muscles, Increase core strength, Inhibit abnormal muscle activity, and Improve lumbosacral and coccygeal mobility in order to Decrease bowel urgency, Improve frequency and ease of bowel movements, Improve ability to engage in sexual intercourse, and Improve ability to perform ADLs.       10 min Therapeutic Activity:  [x]  See flow sheet :    []  Increase Tissue extensibility        [x]  Assess fiber intake    [x]  Assess voiding habits  [x]  Assess bowel habits  []  Other:   Rationale: Increase pelvic floor muscle strength, Improve quality of pelvic floor contractions, Decrease resting tone of the pelvic floor, Increase tissue extensibility of the pelvic floor muscles, Increase core strength, Inhibit abnormal muscle activity, and Improve lumbosacral and coccygeal mobility in order to Decrease bowel urgency, Improve frequency and ease of bowel movements, Improve ability to engage in sexual intercourse, and Improve ability to perform ADLs. 30 min Neuromuscular Re-education:  []  See flow sheet :   []  Pelvic floor strengthening                 []  Pelvic floor downtraining  []  Quality pelvic floor contractions       []  Relaxation techniques  []  Urge suppression exercises  [x]  Other: Biofeedback  Rationale: Increase pelvic floor muscle strength, Improve quality of pelvic floor contractions, Decrease resting tone of the pelvic floor, Increase tissue extensibility of the pelvic floor muscles, Increase core strength, Inhibit abnormal muscle activity, and Improve lumbosacral and coccygeal mobility in order to Decrease bowel urgency, Improve frequency and ease of bowel movements, Improve ability to engage in sexual intercourse, and Improve ability to perform ADLs. With   [] TE   [] TA   [] neuro  [] manual   [] other: Patient Education: [x] Review HEP    [] Progressed/Changed HEP based on:   [] positioning   [] body mechanics   [] transfers   [] heat/ice application    [] other:      Other Objective/Functional Measures:   []baseline resting tone: 4.8-5.3 micro-voltage   In supine:  [x]slow twitch: 10 sec hold, 10 sec rest, 10 rep:  supine: work: 15.1 µV, rest: 5.7 µV     []fast twitch: 3 sec hold, 3 sec rest: work: 17.2 µV, rest: 9 µV    Fatigued with slow twitch after 4 reps    Good tone & relaxation in sitting, ~2.3 micro-voltage  Good contraction with max engagement of TA, PFM, & hip add    Pain Level (0-10 scale) post treatment: 0/10    ASSESSMENT/Changes in Function: Pt present with min elevated tone of PFM. Tone improves with PFM exercises. She demonstrates good resting/relaxation during sitting for voiding simulation.  Will progress to improve endurance of PFM    []  Decrease # of leaks   [] No change []  Improving [] Resolved     []  Decrease hypertonus [] No change []  Improving [] Resolved     []  Increase void interval [] No change []  Improving [] Resolved     []  Increase PF strength [] No change []  Improving [] Resolved     []  Increase PF endurance [] No change []  Improving [] Resolved     []  Increase endurance [] No change []  Improving [] Resolved     []  Decrease # of pads [] No change []  Improving [] Resolved     []  Decrease pain [] No change []  Improving [] Resolved     []  Increased coordination [] No change []  Improving [] Resolved     []  Increased Bowel Frequency [] No change []  Improving [] Resolved       Patient will continue to benefit from skilled PT services to modify and progress therapeutic interventions, address functional mobility deficits, address ROM deficits, address strength deficits, analyze and address soft tissue restrictions, analyze and cue movement patterns, analyze and modify body mechanics/ergonomics, assess and modify postural abnormalities, address imbalance/dizziness, and instruct in home and community integration to attain remaining goals. []  See Plan of Care  [x]  See progress note/recertification  []  See Discharge Summary         Updated Goals: to be achieved in 8 weeks:  Short Term Goals: To be accomplished in 5  weeks:  1. Patient will perform Home Exercise Program accurately as adjunct to PT clinic visits to promote healthy lifestyle and improve quality of life. Status at Progress Note: partial compliance 1-4-23  Current: good compliance 1-11-23     2. Patient will demonstrate accurate simulation of proper defecation dynamics with min cues for increased ease of defecation and more normal function. Status at Progress Note: progressing gradually 1-4-23  Current: progressing slowly 1-20-23; decreased straining/hard pushing 1-25-23     3.  Patient will report at least 25% improvement with emptying bladder & bowel to improve her QOL.  Status at Progress Note: max straining, incomplete emptying, persistent pressure/urgency; progressing well 1-4-23        Long Term Goals: To be accomplished in 8 weeks:  1. Patient demonstrate independence in [facility] for maintenance of Pelvic Floor program and improved quality of life. Status at Progress Note: partial compliance 1-4-23     2. Patient will report at least 60% improvement with emptying bladder & bowel to improve her QOL. Status at Progress Note: max straining, incomplete emptying, persistent pressure/urgency; progressing gradually 1-4-23     3. Patient will demonstrate improvement of PFM strength at least 1/5 grade to improve ease with voiding and ADLs performance. Status at Progress Note: 1+/5; progressing gradually 1-4-23     4. Patient will be able to perform side plank for 30 seconds with good form indicating improved core strength for voiding & ADLs performance. Status at Progress Note: 12 sec with right plank, 16 with Left side; not met, 2nd visit today 1-4-23     4. Patient will have FOTO score for Bowel Constipation change of 4 & 11 points indicating improvement in function.   Status at Progress Note: progressing with Bowel constipation (improved 7 points) 1-4-23       PLAN  [x]  Upgrade activities as tolerated     [x]  Continue plan of care  []  Update interventions per flow sheet       []  Discharge due to:_  []  Other:_      Petty Mcclain 1/25/2023  10:19 AM    Future Appointments   Date Time Provider Susanna Rao   1/25/2023  4:00 PM Ernie Estimable DRISGMK [facility]   1/27/2023 12:00 PM Ernie Estimable MMCPTPB [facility]   2/8/2023  5:00 PM Ernie Estimable MMCPTPB [facility]   2/10/2023 12:00 PM Ernie Estimable MMCPTPB [facility]   2/15/2023  4:00 PM Ernie Estimable RHVHSAM [facility]   2/17/2023 12:00 PM Ernie Estimable MMCPTPB [facility]   2/22/2023  4:00 PM Ernie Estimable IPPTPAV [facility]   2/24/2023 12:00 PM Ernie Estimable MMCPTPB [facility]   3/1/2023  4:00 PM Ernie Estimable MMCPTPB [facility]

## 2023-01-27 ENCOUNTER — HOSPITAL ENCOUNTER (OUTPATIENT)
Dept: PHYSICAL THERAPY | Age: 38
Discharge: HOME | End: 2023-01-27
Payer: COMMERCIAL

## 2023-01-27 PROCEDURE — 97112 NEUROMUSCULAR REEDUCATION: CPT

## 2023-01-27 PROCEDURE — 97110 THERAPEUTIC EXERCISES: CPT

## 2023-01-27 NOTE — PROGRESS NOTES
PF DAILY TREATMENT NOTE 3-16    Patient Name: Steve Peters  Date:2023  : 1985  [x]  Patient  Verified  Payor: BLUE CROSS / Plan: [employer] [facility] [place] / Product Type: PPO /    In time: 12:05  Out time: 12:58  Total Treatment Time (min): 53  Visit #: 5 of     Medicare/BCBS Only   Total Timed Codes (min):  53 1:1 Treatment Time:  53     Treatment Area: [x] Pelvic Floor     [] Other:      SUBJECTIVE  Pain Level (0-10 scale): 0/10  Any medication changes, allergies to medications, adverse drug reactions, diagnosis change, or new procedure performed?: [x] No    [] Yes (see summary sheet for update)  Subjective functional status/changes:   [] No changes reported  Pt reports good & complete BM during the last several       OBJECTIVE      38 min Therapeutic Exercise:  [x] See flow sheet :   [x]  Pelvic floor strengthening                 []  Pelvic floor downtraining  []  Quality pelvic floor contractions       []  Relaxation techniques  []  Urge suppression exercises  [x]  Other: hips & core strengthening  Rationale: Increase pelvic floor muscle strength, Improve quality of pelvic floor contractions, Decrease resting tone of the pelvic floor, Increase tissue extensibility of the pelvic floor muscles, Increase core strength, Inhibit abnormal muscle activity, and Improve lumbosacral and coccygeal mobility in order to Improve frequency and ease of bowel movements and Improve ability to perform ADLs.       15 min Neuromuscular Re-education:  [x]  See flow sheet :   [x]  Pelvic floor strengthening                 []  Pelvic floor downtraining  []  Quality pelvic floor contractions       []  Relaxation techniques  []  Urge suppression exercises  [x]  Other: hips & core strengthening  Increase pelvic floor muscle strength, Improve quality of pelvic floor contractions, Decrease resting tone of the pelvic floor, Increase tissue extensibility of the pelvic floor muscles, Increase core strength, Inhibit abnormal muscle activity, and Improve lumbosacral and coccygeal mobility in order to Improve frequency and ease of bowel movements and Improve ability to perform ADLs. With   [] TE   [] TA   [] neuro  [] manual   [] other: Patient Education: [x] Review HEP    [] Progressed/Changed HEP based on:   [] positioning   [] body mechanics   [] transfers   [] heat/ice application    [] other:      Other Objective/Functional Measures:   []baseline resting tone:   []slow twitch mms   []fast twitch mms   Right oblique deemed weaker as pt has more difficulty with side plank & Left limb movement     Pain Level (0-10 scale) post treatment: 0/10    ASSESSMENT/Changes in Function: Pt demonstrates good motivation with all therex today, no pain, only mod fatigued noted. BM ease improves gradually. Will progress with manual and therex as tolerated next visit.       []  Decrease # of leaks   [] No change []  Improving [] Resolved     []  Decrease hypertonus [] No change []  Improving [] Resolved     []  Increase void interval [] No change []  Improving [] Resolved     []  Increase PF strength [] No change []  Improving [] Resolved     []  Increase PF endurance [] No change []  Improving [] Resolved     []  Increase endurance [] No change []  Improving [] Resolved     []  Decrease # of pads [] No change []  Improving [] Resolved     []  Decrease pain [] No change []  Improving [] Resolved     []  Increased coordination [] No change []  Improving [] Resolved     []  Increased Bowel Frequency [] No change []  Improving [] Resolved       Patient will continue to benefit from skilled PT services to modify and progress therapeutic interventions, address functional mobility deficits, address ROM deficits, address strength deficits, analyze and address soft tissue restrictions, analyze and cue movement patterns, analyze and modify body mechanics/ergonomics, assess and modify postural abnormalities, address imbalance/dizziness, and instruct in home and community integration to attain remaining goals. []  See Plan of Care  [x]  See progress note/recertification  []  See Discharge Summary           Progress towards goals / Updated goals:  Goals: to be achieved in 8 weeks:  Short Term Goals: To be accomplished in 5  weeks:  1. Patient will perform Home Exercise Program accurately as adjunct to PT clinic visits to promote healthy lifestyle and improve quality of life. Status at Progress Note: partial compliance 1-4-23  Current: good compliance 1-11-23     2. Patient will demonstrate accurate simulation of proper defecation dynamics with min cues for increased ease of defecation and more normal function. Status at Progress Note: progressing gradually 1-4-23  Current: progressing slowly 1-20-23; decreased straining/hard pushing 1-25-23     3. Patient will report at least 25% improvement with emptying bladder & bowel to improve her QOL. Status at Progress Note: max straining, incomplete emptying, persistent pressure/urgency; progressing well 1-4-23        Long Term Goals: To be accomplished in 8 weeks:  1. Patient demonstrate independence in HEP for maintenance of Pelvic Floor program and improved quality of life. Status at Progress Note: partial compliance 1-4-23     2. Patient will report at least 60% improvement with emptying bladder & bowel to improve her QOL. Status at Progress Note: max straining, incomplete emptying, persistent pressure/urgency; progressing gradually 1-4-23     3. Patient will demonstrate improvement of PFM strength at least 1/5 grade to improve ease with voiding and ADLs performance. Status at Progress Note: 1+/5; progressing gradually 1-4-23     4. Patient will be able to perform side plank for 30 seconds with good form indicating improved core strength for voiding & ADLs performance. Status at Progress Note: 12 sec with right plank, 16 with Left side; not met, 2nd visit today 1-4-23     4.  Patient will have FOTO score for Bowel Constipation change of 4 & 11 points indicating improvement in function.   Status at Progress Note: progressing with Bowel constipation (improved 7 points) 1-4-23       PLAN  [x]  Upgrade activities as tolerated     [x]  Continue plan of care  []  Update interventions per flow sheet       []  Discharge due to:_  []  Other:_      Kiana Christianson 1/27/2023  9:43 AM    Future Appointments   Date Time Provider Susanna Rao   1/27/2023 12:00 PM Voncille Ariane LACTIAS [facility]   2/8/2023  5:00 PM Voncille Springdale MMCPTPB [facility]   2/10/2023 12:00 PM Voncille Ariane MMCPTPB [facility]   2/15/2023  4:00 PM Voncille Ariane WMMSGLU [facility]   2/17/2023 12:00 PM Voncille Ariane MMCPTPB [facility]   2/22/2023  4:00 PM Voncille Springdale [id number] [facility]   2/24/2023 12:00 PM Voncille Springdale MMCPTPB [facility]   3/1/2023  4:00 PM Voncille Ariane MMCPTPB [facility]

## 2023-02-08 ENCOUNTER — HOSPITAL ENCOUNTER (OUTPATIENT)
Dept: PHYSICAL THERAPY | Age: 38
Discharge: HOME | End: 2023-02-08
Payer: COMMERCIAL

## 2023-02-08 PROCEDURE — 97140 MANUAL THERAPY 1/> REGIONS: CPT

## 2023-02-08 PROCEDURE — 97110 THERAPEUTIC EXERCISES: CPT

## 2023-02-08 PROCEDURE — 97530 THERAPEUTIC ACTIVITIES: CPT

## 2023-02-08 NOTE — PROGRESS NOTES
PF DAILY TREATMENT NOTE 3-16    Patient Name: Sue Fields  Date:2023  : 1985  [x]  Patient  Verified  Payor: BLUE CROSS / Plan:  [facility] / Product Type: PPO /    In time: 5:05  Out time: 5:59  Total Treatment Time (min): 54  Visit #: 6 of     Medicare/BCBS Only   Total Timed Codes (min):  44 1:1 Treatment Time:  44     Treatment Area: [x] Pelvic Floor     [] Other:    SUBJECTIVE  Pain Level (0-10 scale): 0/10  Any medication changes, allergies to medications, adverse drug reactions, diagnosis change, or new procedure performed?: [x] No    [] Yes (see summary sheet for update)  Subjective functional status/changes:   [] No changes reported  Pt reports improving ease with voiding bladder, no significant change with bowel function.        OBJECTIVE    Modality rationale: decrease pain and increase tissue extensibility to improve the patients ability to tolerate ADLs   Min Type Additional Details    [] Estim:  []Unatt       []IFC  []Premod                        []Other:  []w/ice   []w/heat  Position:  Location:    [] Estim: []Att    []TENS instruct  []NMES                    []Other:  []w/US   []w/ice   []w/heat  Position:  Location:    []  Ultrasound: []Continuous   [] Pulsed                           []1MHz   []3MHz Position:  Location:   10 []  Ice     [x]  heat  []  Ice massage  []  Laser   []  Anodyne Position: supine, wedge under knees  Location: PFM   [x] Skin assessment post-treatment:  [x]intact []redness- no adverse reaction    []redness - adverse reaction:         19 min Therapeutic Exercise:  [] See flow sheet :   [x]  Pelvic floor strengthening                 []  Pelvic floor downtraining  []  Quality pelvic floor contractions       [x]  Relaxation techniques  []  Urge suppression exercises  []  Other:  Rationale: Increase pelvic floor muscle strength, Improve quality of pelvic floor contractions, Decrease resting tone of the pelvic floor, Increase tissue extensibility of the pelvic floor muscles, Increase core strength, Inhibit abnormal muscle activity, and Improve lumbosacral and coccygeal mobility in order to Improve frequency and ease of bowel movements and Improve ability to perform ADLs. 15 min Therapeutic Activity:  []  See flow sheet :    []  Increase Tissue extensibility        [x]  Assess fiber intake    [x]  Assess voiding habits  [x]  Assess bowel habits  []  Other:   Rationale: Increase pelvic floor muscle strength, Improve quality of pelvic floor contractions, Decrease resting tone of the pelvic floor, Increase tissue extensibility of the pelvic floor muscles, Increase core strength, Inhibit abnormal muscle activity, and Improve lumbosacral and coccygeal mobility in order to Improve frequency and ease of bowel movements, Improve ability to engage in sexual intercourse, and Improve ability to perform ADLs. 10 min Manual Therapy:  intra-vaginal MFR, perineum stretching   The manual therapy interventions were performed at a separate and distinct time from the therapeutic activities interventions. Rationale: Increase pelvic floor muscle strength, Improve quality of pelvic floor contractions, Decrease resting tone of the pelvic floor, Inhibit abnormal muscle activity, and Improve lumbosacral and coccygeal mobility in order to Improve frequency and ease of bowel movements, Improve ability to engage in sexual intercourse, and Improve ability to perform ADLs.             With   [] TE   [] TA   [] neuro  [] manual   [] other: Patient Education: [x] Review HEP    [] Progressed/Changed HEP based on:   [] positioning   [] body mechanics   [] transfers   [] heat/ice application    [] other:      Other Objective/Functional Measures:   []baseline resting tone:   []slow twitch mms   []fast twitch mms    Pain Level (0-10 scale) post treatment: 0/10    ASSESSMENT/Changes in Function: see Progress Note      []  Decrease # of leaks   [] No change []  Improving [] Resolved     [] Decrease hypertonus [] No change []  Improving [] Resolved     []  Increase void interval [] No change []  Improving [] Resolved     []  Increase PF strength [] No change []  Improving [] Resolved     []  Increase PF endurance [] No change []  Improving [] Resolved     []  Increase endurance [] No change []  Improving [] Resolved     []  Decrease # of pads [] No change []  Improving [] Resolved     []  Decrease pain [] No change []  Improving [] Resolved     []  Increased coordination [] No change []  Improving [] Resolved     []  Increased Bowel Frequency [] No change []  Improving [] Resolved       Patient will continue to benefit from skilled PT services to modify and progress therapeutic interventions, address functional mobility deficits, address ROM deficits, address strength deficits, analyze and address soft tissue restrictions, analyze and cue movement patterns, analyze and modify body mechanics/ergonomics, assess and modify postural abnormalities, address imbalance/dizziness, and instruct in home and community integration to attain remaining goals. []  See Plan of Care  [x]  See progress note/recertification  []  See Discharge Summary           Progress towards goals / Updated goals:  Goals: to be achieved in 8 weeks:  Short Term Goals: To be accomplished in 5  weeks:  1. Patient will perform Home Exercise Program accurately as adjunct to PT clinic visits to promote healthy lifestyle and improve quality of life. Status at Progress Note: partial compliance 1-4-23  Current: good compliance 1-11-23; good compliance 2-8-23     2. Patient will demonstrate accurate simulation of proper defecation dynamics with min cues for increased ease of defecation and more normal function.     Status at Progress Note: progressing gradually 1-4-23  Current: progressing slowly 1-20-23; decreased straining/hard pushing 1-25-23; occasionally requiring manual assistant, very little improvement with pushing/straining 2-8-23 3. Patient will report at least 25% improvement with emptying bladder & bowel to improve her QOL. Status at Progress Note: max straining, incomplete emptying, persistent pressure/urgency; progressing well 1-4-23  Current: met with bladder (40%), progressing slowly with bowel (10%) 2-8-23        Long Term Goals: To be accomplished in 8 weeks:  1. Patient demonstrate independence in [facility] for maintenance of Pelvic Floor program and improved quality of life. Status at Progress Note: partial compliance 1-4-23  Current; good compliance 2-8-23    2. Patient will report at least 60% improvement with emptying bladder & bowel to improve her QOL. Status at Progress Note: max straining, incomplete emptying, persistent pressure/urgency; progressing gradually 1-4-23  Current: progressing well with bladder (40%), progressing slowly with bowel (10%)  2-8-23    3. Patient will demonstrate improvement of PFM strength at least 1/5 grade to improve ease with voiding and ADLs performance. Status at Progress Note: 1+/5; progressing gradually 1-4-23  Current: met 2+/5 2-8-23    4. Patient will be able to perform side plank for 30 seconds with good form indicating improved core strength for voiding & ADLs performance. Status at Progress Note: 12 sec with right plank, 16 with Left side; not met, 2nd visit today 1-4-23  Current: progressing slowly 2-8-23    5. Patient will have FOTO score for Bowel Constipation change of 11 points indicating improvement in function.   Status at Progress Note: progressing with Bowel constipation (improved 7 points) 1-4-23  Current: nearly met, improved 10  2-8-23      PLAN  [x]  Upgrade activities as tolerated     [x]  Continue plan of care  []  Update interventions per flow sheet       []  Discharge due to:_  []  Other:_      Jadon Bejarano 2/8/2023  10:30 AM    Future Appointments   Date Time Provider Susanna Rao   2/8/2023  5:00 PM Virl Kaylin [facility]HEIQLG [facility]   2/10/2023 12:00 PM Ritesh Jessica M MMCPTPB [facility]   2/15/2023  4:00 PM Wilhelmena Home GJSSHIA [facility]   2/17/2023 12:00 PM Wilhelmena Home MMCPTPB [facility]   2/22/2023  4:00 PM Wilhelmena Home [facility]CPTP[facility]   2/24/2023 12:00 PM Wilhelmena Home [facility]CPTP[facility]   3/1/2023  4:00 PM Wilhelmena Home MMCPTPB [facility]

## 2023-02-08 NOTE — PROGRESS NOTES
[facility]  [address]  [phone] fax    Pelvic Floor Progress Note    Patient name: Kenya Fernández Start of Care: 2022   Referral source: Gonsalo Patel NP : 1985               Medical Diagnosis: Other specified disorders of muscle [M62.89]  Payor: BLUE CROSS / Plan:  [facility] / Product Type: PPO /  Onset Date: 2022               Treatment Diagnosis: PFD, difficulty with voiding, UI   Prior Hospitalization: see medical history Provider#: [id number]   Medications: Verified on Patient summary List    Comorbidities: scoliosis surgery, thyroid problems, alcohol use, visual impaired,    Prior Level of Function: chronic back pain, mod ind with mobility, no UI symptoms nor bowel problem     Visits from Start of Care: 8    Missed Visits: 1    Established Goals:           Excellent Good         Limited           None  [x] Increase Pelvic MM strength []  [x]  [x]  []  [] Decrease Pelvic MM hypertonus []  []  []  []  [] Decrease Incontinence Episodes []  []  []  []   [x] Improve Voiding Habits  []  []  [x]  []  [] Decreased Urgency   []  []  []  []    Key Functional Changes: improving ease with voiding bladder      Updated Goals: to be achieved in 4 weeks:  1. Patient will perform Home Exercise Program accurately as adjunct to PT clinic visits to promote healthy lifestyle and improve quality of life. Status at Progress Note: good compliance 23     2. Patient will demonstrate accurate simulation of proper defecation dynamics with min cues for increased ease of defecation and more normal function. Status at Progress Note: occasionally requiring manual assistant, very little improvement with pushing/straining 23      3. Patient will report at least 60% improvement with emptying bladder & bowel to improve her QOL.   Status at Progress Note: progressing well with bladder (40%), progressing slowly with bowel (10%)  2-8-23     3. Patient will demonstrate improvement of PFM strength at least 1/5 grade to improve ease with voiding and ADLs performance. Status at Progress Note: 2+/5 2-8-23     4. Patient will be able to perform side plank for 30 seconds with good form indicating improved core strength for voiding & ADLs performance. Status at Progress Note: 12 sec with right plank, 16 with Left side; progressing slowly 2-8-23     5. Patient will have FOTO score for Bowel Constipation change of 11 points indicating improvement in function. Status at Progress Note: nearly met, improved 10  2-8-23       ASSESSMENT/RECOMMENDATIONS: Pt's making gradual progress with PT, reports 40% improvement with bladder voiding, 10% with bowel. PFM strength improved gradually. She cont to be limited with core and glute strength. Reports occasional manual assistance for BM. Pt would cont to benefit from skilled Pelvic Floor PT to Increase pelvic floor muscle strength, Improve quality of pelvic floor contractions, Decrease resting tone of the pelvic floor, Increase tissue extensibility of the pelvic floor muscles, Increase core strength, Inhibit abnormal muscle activity, and Improve lumbosacral and coccygeal mobility in order to Improve frequency and ease of bowel movements, Improve ability to engage in sexual intercourse, and Improve ability to perform ADLs.       [x]Continue therapy per initial plan/protocol at a frequency of  1 x per week for 4-8 weeks  []Continue therapy with the following recommended changes:_____________________      _____________________________________________________________________  []Discontinue therapy progressing towards or have reached established goals  []Discontinue therapy due to lack of appreciable progress towards goals  []Discontinue therapy due to lack of attendance or compliance  []Await Physician's recommendations/decisions regarding therapy  []Other:________________________________________________________________    Thank you for this referral.   Balaji Jamison 2/8/2023 10:31 AM  NOTE TO PHYSICIAN:  PLEASE COMPLETE THE ORDERS BELOW AND   FAX TO [facility]: [phone]  If you are unable to process this request in 24 hours please contact our office: [phone]    I have read the above report and request that my patient continue as recommended. I have read the above report and request that my patient continue therapy with the following changes/special instructions:___________________________________________________________  I have read the above report and request that my patient be discharged from therapy.     [de-identified] Signature:____________Date:_________TIME:________     Jorgito Burgos NP  ** Signature, Date and Time must be completed for valid certification **

## 2023-02-10 ENCOUNTER — HOSPITAL ENCOUNTER (OUTPATIENT)
Dept: PHYSICAL THERAPY | Age: 38
Discharge: HOME | End: 2023-02-10
Payer: COMMERCIAL

## 2023-02-10 PROCEDURE — 97112 NEUROMUSCULAR REEDUCATION: CPT

## 2023-02-10 PROCEDURE — 97110 THERAPEUTIC EXERCISES: CPT

## 2023-02-10 NOTE — PROGRESS NOTES
PF DAILY TREATMENT NOTE 3-16    Patient Name: Molly Umaña  Date:2/10/2023  : 1985  [x]  Patient  Verified  Payor: BLUE CROSS / Plan: [address] [place] [address] [place] / Product Type: PPO /    In time: 12:10  Out time: 1:08  Total Treatment Time (min): 58  Visit #: 1 of -8      Medicare/BCBS Only   Total Timed Codes (min):  58 1:1 Treatment Time:  58     Treatment Area: [x] Pelvic Floor     [] Other:    SUBJECTIVE  Pain Level (0-10 scale): 0/10  Any medication changes, allergies to medications, adverse drug reactions, diagnosis change, or new procedure performed?: [x] No    [] Yes (see summary sheet for update)  Subjective functional status/changes:   [] No changes reported  Pt reports doing ok so far. OBJECTIVE    23 min Therapeutic Exercise:  [] See flow sheet :   [x]  Pelvic floor strengthening                 []  Pelvic floor downtraining  []  Quality pelvic floor contractions       [x]  Relaxation techniques  []  Urge suppression exercises  [x]  Other: core & hip strengthening   Rationale: Increase pelvic floor muscle strength, Improve quality of pelvic floor contractions, Decrease resting tone of the pelvic floor, Increase tissue extensibility of the pelvic floor muscles, Increase core strength, Inhibit abnormal muscle activity, and Improve lumbosacral and coccygeal mobility in order to Increase urinary continence, Decrease bowel urgency, Improve frequency and ease of bowel movements, and Improve ability to perform ADLs.       25 min Neuromuscular Re-education:  []  See flow sheet :   [x]  Pelvic floor strengthening                 []  Pelvic floor downtraining  []  Quality pelvic floor contractions       []  Relaxation techniques  []  Urge suppression exercises  [x]  Other: core & hip strengthening   Rationale: Increase pelvic floor muscle strength, Improve quality of pelvic floor contractions, Decrease resting tone of the pelvic floor, Increase tissue extensibility of the pelvic floor muscles, Increase core strength, Inhibit abnormal muscle activity, and Improve lumbosacral and coccygeal mobility in order to Increase urinary continence, Decrease bowel urgency, Improve frequency and ease of bowel movements, and Improve ability to perform ADLs. With   [] TE   [] TA   [] neuro  [] manual   [] other: Patient Education: [x] Review HEP    [] Progressed/Changed HEP based on:   [] positioning   [] body mechanics   [] transfers   [] heat/ice application    [] other:      Other Objective/Functional Measures:   []baseline resting tone:   []slow twitch mms   []fast twitch mms    Pain Level (0-10 scale) post treatment: 0/10    ASSESSMENT/Changes in Function: good form with therex; pt was mod fatigued and reported min soreness with sacral region. Progress PFM strengthening to 1 set/2 hours; pt demonstrates fairly good motivation. Will progress as tolerated.     []  Decrease # of leaks   [] No change []  Improving [] Resolved     []  Decrease hypertonus [] No change []  Improving [] Resolved     []  Increase void interval [] No change []  Improving [] Resolved     []  Increase PF strength [] No change []  Improving [] Resolved     []  Increase PF endurance [] No change []  Improving [] Resolved     []  Increase endurance [] No change []  Improving [] Resolved     []  Decrease # of pads [] No change []  Improving [] Resolved     []  Decrease pain [] No change []  Improving [] Resolved     []  Increased coordination [] No change []  Improving [] Resolved     []  Increased Bowel Frequency [] No change []  Improving [] Resolved       Patient will continue to benefit from skilled PT services to modify and progress therapeutic interventions, address functional mobility deficits, address ROM deficits, address strength deficits, analyze and address soft tissue restrictions, analyze and cue movement patterns, analyze and modify body mechanics/ergonomics, assess and modify postural abnormalities, address imbalance/dizziness, and instruct in home and community integration to attain remaining goals. []  See Plan of Care  [x]  See progress note/recertification  []  See Discharge Summary           Progress towards goals / Updated goals:  Updated Goals: to be achieved in 4 weeks:  1. Patient will perform Home Exercise Program accurately as adjunct to PT clinic visits to promote healthy lifestyle and improve quality of life. Status at Progress Note: good compliance 2-8-23     2. Patient will demonstrate accurate simulation of proper defecation dynamics with min cues for increased ease of defecation and more normal function. Status at Progress Note: occasionally requiring manual assistant, very little improvement with pushing/straining 2-8-23      3. Patient will report at least 60% improvement with emptying bladder & bowel to improve her QOL. Status at Progress Note: progressing well with bladder (40%), progressing slowly with bowel (10%)  2-8-23     3. Patient will demonstrate improvement of PFM strength at least 1/5 grade to improve ease with voiding and ADLs performance. Status at Progress Note: 2+/5 2-8-23     4. Patient will be able to perform side plank for 30 seconds with good form indicating improved core strength for voiding & ADLs performance. Status at Progress Note: 12 sec with right plank, 16 with Left side; progressing slowly 2-8-23     5. Patient will have FOTO score for Bowel Constipation change of 11 points indicating improvement in function.   Status at Progress Note: nearly met, improved 10  2-8-23       PLAN  [x]  Upgrade activities as tolerated     [x]  Continue plan of care  []  Update interventions per flow sheet       []  Discharge due to:_  []  Other:_      [facility] 2/10/2023  10:54 AM    Future Appointments   Date Time Provider Susanna Rao   2/10/2023 12:00 PM Mary Sadler [id number] [facility]   2/15/2023  4:00 PM Mary Sadler VSDMTHO [facility]   2/17/2023 12:00 PM Mary Sadler MMCPTPB [facility]   2/22/2023 4:00 PM Helen ALONZOZFOHM [facility]   2/24/2023 12:00 PM Helen Sykes MMCPTP[facility]   3/1/2023  4:00 PM Helen WEBBPTPB [facility]

## 2023-02-15 ENCOUNTER — HOSPITAL ENCOUNTER (OUTPATIENT)
Facility: HOSPITAL | Age: 38
Setting detail: RECURRING SERIES
Discharge: HOME | End: 2023-02-18
Payer: COMMERCIAL

## 2023-02-15 ENCOUNTER — APPOINTMENT (OUTPATIENT)
Dept: PHYSICAL THERAPY | Age: 38
End: 2023-02-15
Payer: COMMERCIAL

## 2023-02-15 PROCEDURE — 97530 THERAPEUTIC ACTIVITIES: CPT

## 2023-02-15 PROCEDURE — 97110 THERAPEUTIC EXERCISES: CPT

## 2023-02-15 NOTE — PROGRESS NOTES
PF DAILY TREATMENT NOTE (2023)      Patient Name: iNeves Espinosa    Date: 2/15/2023    : 1985  Insurance: Payor: Tevin Lara / Plan: [facility] [employer] / Product Type: *No Product type* /      Patient  verified yes     Visit #   Current / Total 2 4-8   Time   In / Out 4:02 4:55   Pain   In / Out 0/10 0/10   Subjective Functional Status/Changes: Pt reports not being able to keep track with 2 hour PFM exercises. No significant change with bowel. Changes to:  Meds, Allergies, Med Hx, Sx Hx? If yes, update Summary List no       TREATMENT AREA =  Pelvic floor dysfunction [M62.89]  Difficulty voiding [R39.198]      OBJECTIVE      45 min Therapeutic Exercise:  [] See flow sheet :   [x]  Pelvic floor strengthening                 []  Pelvic floor downtraining  []  Quality pelvic floor contractions       [x]  Relaxation techniques  []  Urge suppression exercises  []  Other:  Rationale: Increase pelvic floor muscle strength, Improve quality of pelvic floor contractions, Decrease resting tone of the pelvic floor, Increase tissue extensibility of the pelvic floor muscles, Increase core strength, Inhibit abnormal muscle activity, and Improve lumbosacral and coccygeal mobility in order to Decrease bowel urgency, Improve frequency and ease of bowel movements, and Improve ability to perform ADLs. 8 min Therapeutic Activity:  []  See flow sheet :    []  Increase Tissue extensibility        [x]  Assess fiber intake    [x]  Assess voiding habits  [x]  Assess bowel habits  []  Other:   Rationale:  Increase pelvic floor muscle strength, Improve quality of pelvic floor contractions, Decrease resting tone of the pelvic floor, Increase tissue extensibility of the pelvic floor muscles, Increase core strength, Inhibit abnormal muscle activity, and Improve lumbosacral and coccygeal mobility in order to Decrease bowel urgency, Improve frequency and ease of bowel movements, and Improve ability to perform ADLs. With   [] TE   [] TA   [] neuro  [] manual   [] other: Patient Education: [x] Review HEP    [] Progressed/Changed HEP based on:   [] positioning   [] body mechanics   [] transfers   [] heat/ice application    [] other:      Other Objective/Functional Measures:   []baseline resting tone:   []slow twitch mms   []fast twitch mms    ASSESSMENT/Changes in Function: pt's progressing gradually with strengthening therex, improved tolerance with plank, no significant fatigue with progressive PFM strengthening therex for today. Will progress as tolerated. []  Decrease # of leaks   [] No change []  Improving [] Resolved     []  Decrease hypertonus [] No change []  Improving [] Resolved     []  Increase void interval [] No change []  Improving [] Resolved     []  Increase PF strength [] No change []  Improving [] Resolved     []  Increase PF endurance [] No change []  Improving [] Resolved     []  Increase endurance [] No change []  Improving [] Resolved     []  Decrease # of pads [] No change []  Improving [] Resolved     []  Decrease pain [] No change []  Improving [] Resolved     []  Increased coordination [] No change []  Improving [] Resolved     []  Increased Bowel Frequency [] No change []  Improving [] Resolved       Patient will continue to benefit from skilled PT services to Rationale: Increase pelvic floor muscle strength, Improve quality of pelvic floor contractions, Decrease resting tone of the pelvic floor, Increase tissue extensibility of the pelvic floor muscles, Increase core strength, Inhibit abnormal muscle activity, and Improve lumbosacral and coccygeal mobility in order to attain remaining goals. []  See Plan of Care  [x]  See progress note/recertification  []  See Discharge Summary         Progress towards goals / Updated goals:  Updated Goals: to be achieved in 4 weeks:  1.  Patient will perform Home Exercise Program accurately as adjunct to PT clinic visits to promote healthy lifestyle and improve quality of life. Status at Progress Note: good compliance 2-8-23     2. Patient will demonstrate accurate simulation of proper defecation dynamics with min cues for increased ease of defecation and more normal function. Status at Progress Note: occasionally requiring manual assistant, very little improvement with pushing/straining 2-8-23      3. Patient will report at least 60% improvement with emptying bladder & bowel to improve her QOL. Status at Progress Note: progressing well with bladder (40%), progressing slowly with bowel (10%)  2-8-23     3. Patient will demonstrate improvement of PFM strength at least 1/5 grade to improve ease with voiding and ADLs performance. Status at Progress Note: 2+/5 2-8-23     4. Patient will be able to perform side plank for 30 seconds with good form indicating improved core strength for voiding & ADLs performance. Status at Progress Note: 12 sec with right plank, 16 with Left side; progressing slowly 2-8-23     5. Patient will have FOTO score for Bowel Constipation change of 11 points indicating improvement in function.   Status at Progress Note: nearly met, improved 10  2-8-23       PLAN  [x]  Upgrade activities as tolerated     [x]  Continue plan of care  []  Update interventions per flow sheet       []  Discharge due to:_  []  Other:_        Daron Dudley, PT 2/15/2023  10:23 AM    Future Appointments   Date Time Provider Hermila Connell   2/15/2023  4:00 PM Daron Dudley, PT [facility] [address]   2/17/2023 12:00 PM Thienphuc Hyacinth Mews, PT [facility] [address]   2/22/2023  4:00 PM Thienphuc Hyacinth Mews, PT [facility] [address]   2/24/2023 12:00 PM Thienphuc Hyacinth Mews, PT [facility] [address]   3/1/2023  4:00 PM Thienphuc Hyacinth Mews, PT [facility] [address]

## 2023-02-17 ENCOUNTER — APPOINTMENT (OUTPATIENT)
Facility: HOSPITAL | Age: 38
End: 2023-02-17
Payer: COMMERCIAL

## 2023-02-17 ENCOUNTER — APPOINTMENT (OUTPATIENT)
Dept: PHYSICAL THERAPY | Age: 38
End: 2023-02-17
Payer: COMMERCIAL

## 2023-02-22 ENCOUNTER — HOSPITAL ENCOUNTER (OUTPATIENT)
Facility: HOSPITAL | Age: 38
Setting detail: RECURRING SERIES
Discharge: HOME | End: 2023-02-25
Payer: COMMERCIAL

## 2023-02-22 ENCOUNTER — APPOINTMENT (OUTPATIENT)
Dept: PHYSICAL THERAPY | Age: 38
End: 2023-02-22
Payer: COMMERCIAL

## 2023-02-22 PROCEDURE — 97110 THERAPEUTIC EXERCISES: CPT

## 2023-02-22 PROCEDURE — 97530 THERAPEUTIC ACTIVITIES: CPT

## 2023-02-22 NOTE — PROGRESS NOTES
PF DAILY TREATMENT NOTE (2023)      Patient Name: Radha Regalado    Date: 2023    : 1985  Insurance: Payor: Antoine Angel / Plan: Ed Rivera / Product Type: *No Product type* /      Patient  verified yes     Visit #   Current / Total 3 4-8   Time   In / Out 3:47  4:39   Pain   In / Out 0/10 0/10   Subjective Functional Status/Changes: Pt reports doing ok. She has no problem with gas or urgency awareness of rectum/bowel. Changes to:  Meds, Allergies, Med Hx, Sx Hx? If yes, update Summary List no         TREATMENT AREA =  Pelvic floor dysfunction [M62.89]  Difficulty in voiding [R39.198]      OBJECTIVE    42 min Therapeutic Exercise:  [x] See flow sheet :   [x]  Pelvic floor strengthening                 []  Pelvic floor downtraining  []  Quality pelvic floor contractions       [x]  Relaxation techniques  []  Urge suppression exercises  [x]  Other: core & hips strengthening  Rationale: Increase pelvic floor muscle strength, Improve quality of pelvic floor contractions, Decrease resting tone of the pelvic floor, Increase tissue extensibility of the pelvic floor muscles, Increase core strength, Inhibit abnormal muscle activity, and Improve lumbosacral and coccygeal mobility in order to Improve frequency and ease of bowel movements and Improve ability to perform ADLs. 10 min Therapeutic Activity:  []  See flow sheet :    []  Increase Tissue extensibility        [x]  Assess fiber intake    [x]  Assess voiding habits  [x]  Assess bowel habits  []  Other:   Rationale: Increase pelvic floor muscle strength, Improve quality of pelvic floor contractions, Decrease resting tone of the pelvic floor, Increase tissue extensibility of the pelvic floor muscles, Increase core strength, Inhibit abnormal muscle activity, and Improve lumbosacral and coccygeal mobility in order to Improve frequency and ease of bowel movements and Improve ability to perform ADLs.             With   [] TE   [] TA   [] neuro  [] manual   [] other: Patient Education: [x] Review HEP    [] Progressed/Changed HEP based on:   [] positioning   [] body mechanics   [] transfers   [] heat/ice application    [] other:      Other Objective/Functional Measures:   []baseline resting tone:   []slow twitch mms []fast twitch mms      ASSESSMENT/Changes in Function: cont to progress strengthening therex, focussed on hips/glute & core today. Pt was mod-max fatigued, no pain reported. Will progress with PFM strengthening next visits. []  Decrease # of leaks   [] No change []  Improving [] Resolved     []  Decrease hypertonus [] No change []  Improving [] Resolved     []  Increase void interval [] No change []  Improving [] Resolved     []  Increase PF strength [] No change []  Improving [] Resolved     []  Increase PF endurance [] No change []  Improving [] Resolved     []  Increase endurance [] No change []  Improving [] Resolved     []  Decrease # of pads [] No change []  Improving [] Resolved     []  Decrease pain [] No change []  Improving [] Resolved     []  Increased coordination [] No change []  Improving [] Resolved     []  Increased Bowel Frequency [] No change []  Improving [] Resolved       Patient will continue to benefit from skilled PT / OT services to modify and progress therapeutic interventions, analyze and address functional mobility deficits, analyze and address ROM deficits, analyze and address strength deficits, analyze and address soft tissue restrictions, analyze and cue for proper movement patterns, analyze and modify for postural abnormalities, analyze and address imbalance/dizziness, and instruct in home and community integration to address functional deficits and attain remaining goals. []  See Plan of Care  [x]  See progress note/recertification  []  See Discharge Summary           Progress towards goals / Updated goals:  Updated Goals: to be achieved in 4 weeks:  1.  Patient will perform Home Exercise Program accurately as adjunct to PT clinic visits to promote healthy lifestyle and improve quality of life. Status at Progress Note: good compliance 2-8-23     2. Patient will demonstrate accurate simulation of proper defecation dynamics with min cues for increased ease of defecation and more normal function. Status at Progress Note: occasionally requiring manual assistant, very little improvement with pushing/straining 2-8-23      3. Patient will report at least 60% improvement with emptying bladder & bowel to improve her QOL. Status at Progress Note: progressing well with bladder (40%), progressing slowly with bowel (10%)  2-8-23     3. Patient will demonstrate improvement of PFM strength at least 1/5 grade to improve ease with voiding and ADLs performance. Status at Progress Note: 2+/5 2-8-23     4. Patient will be able to perform side plank for 30 seconds with good form indicating improved core strength for voiding & ADLs performance. Status at Progress Note: 12 sec with right plank, 16 with Left side; progressing slowly 2-8-23  Current: met 2-22-23     5. Patient will have FOTO score for Bowel Constipation change of 11 points indicating improvement in function.   Status at Progress Note: nearly met, improved 10  2-8-23       PLAN  [x]  Upgrade activities as tolerated     [x]  Continue plan of care  []  Update interventions per flow sheet       []  Discharge due to:_  []  Other:_      Ben Richardson, PT 2/22/2023  10:06 AM    Future Appointments   Date Time Provider Hermila Connell   2/22/2023  4:00 PM Ben Richardson, PT [facility]   2/24/2023 12:00 PM Thienphuc Shawn Pontiff, PT [facility]   3/1/2023  4:00 PM Thienphuc Shawn Pontiff, PT [facility]   3/8/2023  4:00 PM Thienphuc Shawn Pontiff, PT [facility]   3/15/2023  5:00 PM Thienphuc Shawn Pontiff, PT [facility]   3/22/2023  4:00 PM Thienphuc Shawn Pontiff, PT [facility]   3/29/2023  4:00 PM Junie Cuenca, PT [email or web address]

## 2023-02-24 ENCOUNTER — APPOINTMENT (OUTPATIENT)
Facility: HOSPITAL | Age: 38
End: 2023-02-24
Payer: COMMERCIAL

## 2023-02-24 ENCOUNTER — APPOINTMENT (OUTPATIENT)
Dept: PHYSICAL THERAPY | Age: 38
End: 2023-02-24
Payer: COMMERCIAL

## 2023-03-01 ENCOUNTER — APPOINTMENT (OUTPATIENT)
Dept: PHYSICAL THERAPY | Age: 38
End: 2023-03-01

## 2023-03-01 ENCOUNTER — HOSPITAL ENCOUNTER (OUTPATIENT)
Facility: HOSPITAL | Age: 38
Setting detail: RECURRING SERIES
Discharge: HOME | End: 2023-03-04
Payer: COMMERCIAL

## 2023-03-01 PROCEDURE — 97530 THERAPEUTIC ACTIVITIES: CPT

## 2023-03-01 PROCEDURE — 97110 THERAPEUTIC EXERCISES: CPT

## 2023-03-01 PROCEDURE — 97112 NEUROMUSCULAR REEDUCATION: CPT

## 2023-03-01 NOTE — PROGRESS NOTES
PF DAILY TREATMENT NOTE (2023)      Patient Name: Archana Courser    Date: 3/1/2023    : 1985  Insurance: Payor: Mary Mushtaq / Plan: [facility] / Product Type: *No Product type* /      Patient  verified yes     Visit #   Current / Total 4 4-8   Time   In / Out 4:00 4:56   Pain   In / Out 0/10 0/10   Subjective Functional Status/Changes: Pt reports doing ok. She's able to maintain 2 hour PFM exercises program better   Changes to:  Meds, Allergies, Med Hx, Sx Hx? If yes, update Summary List no       TREATMENT AREA =  Pelvic floor dysfunction [M62.89]  Difficulty in voiding [R39.198]      OBJECTIVE    31 min Therapeutic Exercise:  [x] See flow sheet :   [x]  Pelvic floor strengthening                 []  Pelvic floor downtraining  []  Quality pelvic floor contractions       [x]  Relaxation techniques  []  Urge suppression exercises  []  Other:  Rationale: Increase pelvic floor muscle strength, Improve quality of pelvic floor contractions, Decrease resting tone of the pelvic floor, Increase tissue extensibility of the pelvic floor muscles, Increase core strength, Inhibit abnormal muscle activity, and Improve lumbosacral and coccygeal mobility in order to Decrease bowel urgency, Improve frequency and ease of bowel movements, and Improve ability to perform ADLs. 10 min Therapeutic Activity:  []  See flow sheet :    []  Increase Tissue extensibility        [x]  Assess fiber intake    [x]  Assess voiding habits  [x]  Assess bowel habits  []  Other:   Rationale:  Increase pelvic floor muscle strength, Improve quality of pelvic floor contractions, Decrease resting tone of the pelvic floor, Increase tissue extensibility of the pelvic floor muscles, Increase core strength, Inhibit abnormal muscle activity, and Improve lumbosacral and coccygeal mobility in order to Decrease bowel urgency, Improve frequency and ease of bowel movements, and Improve ability to perform ADLs.       15 min Neuromuscular Re-education:  []  See flow sheet :   [x]  Pelvic floor strengthening                 []  Pelvic floor downtraining  []  Quality pelvic floor contractions       [x]  Relaxation techniques  []  Urge suppression exercises  []  Other:  Rationale: Increase pelvic floor muscle strength, Improve quality of pelvic floor contractions, Decrease resting tone of the pelvic floor, Increase tissue extensibility of the pelvic floor muscles, Increase core strength, Inhibit abnormal muscle activity, and Improve lumbosacral and coccygeal mobility in order to Decrease bowel urgency, Improve frequency and ease of bowel movements, and Improve ability to perform ADLs. With   [] TE   [] TA   [] neuro  [] manual   [] other: Patient Education: [x] Review HEP    [] Progressed/Changed HEP based on:   [] positioning   [] body mechanics   [] transfers   [] heat/ice application    [] other:      Other Objective/Functional Measures:   []baseline resting tone:   []slow twitch mms   []fast twitch mms      ASSESSMENT/Changes in Function: pt's progressing gradually with all strengthening therex for PFM, core & hips. Will cont progress as tolerated.      []  Decrease # of leaks   [] No change []  Improving [] Resolved     []  Decrease hypertonus [] No change []  Improving [] Resolved     []  Increase void interval [] No change []  Improving [] Resolved     []  Increase PF strength [] No change []  Improving [] Resolved     []  Increase PF endurance [] No change []  Improving [] Resolved     []  Increase endurance [] No change []  Improving [] Resolved     []  Decrease # of pads [] No change []  Improving [] Resolved     []  Decrease pain [] No change []  Improving [] Resolved     []  Increased coordination [] No change []  Improving [] Resolved     []  Increased Bowel Frequency [] No change []  Improving [] Resolved       Patient will continue to benefit from skilled PT / OT services to modify and progress therapeutic interventions, analyze and address functional mobility deficits, analyze and address ROM deficits, analyze and address strength deficits, analyze and address soft tissue restrictions, analyze and cue for proper movement patterns, analyze and modify for postural abnormalities, analyze and address imbalance/dizziness, and instruct in home and community integration to address functional deficits and attain remaining goals. []  See Plan of Care  [x]  See progress note/recertification  []  See Discharge Summary           Progress towards goals / Updated goals:  Updated Goals: to be achieved in 4 weeks:  1. Patient will perform Home Exercise Program accurately as adjunct to PT clinic visits to promote healthy lifestyle and improve quality of life. Status at Progress Note: good compliance 2-8-23  Current: improved tolerance with PFM exercises 3-1-23     2. Patient will demonstrate accurate simulation of proper defecation dynamics with min cues for increased ease of defecation and more normal function. Status at Progress Note: occasionally requiring manual assistant, very little improvement with pushing/straining 2-8-23      3. Patient will report at least 60% improvement with emptying bladder & bowel to improve her QOL. Status at Progress Note: progressing well with bladder (40%), progressing slowly with bowel (10%)  2-8-23     3. Patient will demonstrate improvement of PFM strength at least 1/5 grade to improve ease with voiding and ADLs performance. Status at Progress Note: 2+/5 2-8-23     4. Patient will be able to perform side plank for 30 seconds with good form indicating improved core strength for voiding & ADLs performance. Status at Progress Note: 12 sec with right plank, 16 with Left side; progressing slowly 2-8-23  Current: met 2-22-23     5. Patient will have FOTO score for Bowel Constipation change of 11 points indicating improvement in function.   Status at Progress Note: nearly met, improved 10  2-8-23    PLAN  [x] Upgrade activities as tolerated     [x]  Continue plan of care  []  Update interventions per flow sheet       []  Discharge due to:_  []  Other:_      Gini Montemayor, PT 3/1/2023  10:19 AM    Future Appointments   Date Time Provider Hermila Connell   3/1/2023  4:00 PM Gini Montemayor, PT [facility]   3/8/2023  4:00 PM Junie Harris, PT [facility]   3/15/2023  5:00 PM Junie Harris, PT [facility]   3/22/2023  4:00 PM Junie Harris, PT [facility]   3/29/2023  4:00 PM Junie Harris, PT [facility]

## 2023-03-08 ENCOUNTER — HOSPITAL ENCOUNTER (OUTPATIENT)
Facility: HOSPITAL | Age: 38
Setting detail: RECURRING SERIES
Discharge: HOME | End: 2023-03-11
Payer: COMMERCIAL

## 2023-03-08 PROCEDURE — 97110 THERAPEUTIC EXERCISES: CPT

## 2023-03-08 PROCEDURE — 97530 THERAPEUTIC ACTIVITIES: CPT

## 2023-03-08 NOTE — PROGRESS NOTES
PF DAILY TREATMENT NOTE (2023)      Patient Name: Darry Frankel    Date: 3/8/2023    : 1985  Insurance: Payor: Esther Huff / Plan: MARIA G GALLARDO FEDERAL / Product Type: *No Product type* /      Patient  verified yes     Visit #   Current / Total 5 4-8   Time   In / Out 4:06 4:59   Pain   In / Out 0/10 0/10   Subjective Functional Status/Changes: Pt reports decreased episodes of sudden bowel movement during voiding her bladder. No urgency with bowel during that time. Changes to:  Meds, Allergies, Med Hx, Sx Hx? If yes, update Summary List no       TREATMENT AREA =  Pelvic floor dysfunction [M62.89]  Difficulty in voiding [R39.198]      OBJECTIVE      30 min Therapeutic Exercise:  [] See flow sheet :   [x]  Pelvic floor strengthening                 []  Pelvic floor downtraining  []  Quality pelvic floor contractions       [x]  Relaxation techniques  []  Urge suppression exercises  [x]  Other: core & glute strengthening   Rationale: Increase pelvic floor muscle strength, Improve quality of pelvic floor contractions, Decrease resting tone of the pelvic floor, Increase tissue extensibility of the pelvic floor muscles, Increase core strength, Inhibit abnormal muscle activity, and Improve lumbosacral and coccygeal mobility in order to Improve ability to perform ADLs and void with ease . 23 min Therapeutic Activity:  []  See flow sheet :    []  Increase Tissue extensibility        [x]  Assess fiber intake    [x]  Assess voiding habits  [x]  Assess bowel habits  [x]  Other: FOTO & goals re-assessment  Rationale: Increase pelvic floor muscle strength, Improve quality of pelvic floor contractions, Decrease resting tone of the pelvic floor, Increase tissue extensibility of the pelvic floor muscles, Increase core strength, Inhibit abnormal muscle activity, and Improve lumbosacral and coccygeal mobility in order to Improve ability to perform ADLs and void with ease.             With   [] TE   [] TA   [] neuro  [] manual   [] other: Patient Education: [x] Review HEP    [] Progressed/Changed HEP based on:   [] positioning   [] body mechanics   [] transfers   [] heat/ice application    [] other:      Other Objective/Functional Measures:   []baseline resting tone:   []slow twitch mms   []fast twitch mms    Pain Level (0-10 scale) post treatment: 0/10    ASSESSMENT/Changes in Function: see Progress Note      []  Decrease # of leaks   [] No change []  Improving [] Resolved     []  Decrease hypertonus [] No change []  Improving [] Resolved     []  Increase void interval [] No change []  Improving [] Resolved     []  Increase PF strength [] No change []  Improving [] Resolved     []  Increase PF endurance [] No change []  Improving [] Resolved     []  Increase endurance [] No change []  Improving [] Resolved     []  Decrease # of pads [] No change []  Improving [] Resolved     []  Decrease pain [] No change []  Improving [] Resolved     []  Increased coordination [] No change []  Improving [] Resolved     []  Increased Bowel Frequency [] No change []  Improving [] Resolved       Patient will continue to benefit from skilled PT / OT services to modify and progress therapeutic interventions, analyze and address functional mobility deficits, analyze and address ROM deficits, analyze and address strength deficits, analyze and address soft tissue restrictions, analyze and cue for proper movement patterns, analyze and modify for postural abnormalities, analyze and address imbalance/dizziness, and instruct in home and community integration to address functional deficits and attain remaining goals. []  See Plan of Care  [x]  See progress note/recertification  []  See Discharge Summary           Progress towards goals / Updated goals:  Updated Goals: to be achieved in 4 weeks:  1. Patient will perform Home Exercise Program accurately as adjunct to PT clinic visits to promote healthy lifestyle and improve quality of life.    Status at Progress Note: good compliance 2-8-23  Current: improved tolerance with PFM exercises 3-1-23; good compliance 3-8-23     2. Patient will demonstrate accurate simulation of proper defecation dynamics with min cues for increased ease of defecation and more normal function. Status at Progress Note: occasionally requiring manual assistant, very little improvement with pushing/straining 2-8-23  Current: progressing slowly 3-8-23      3. Patient will report at least 60% improvement with emptying bladder & bowel to improve her QOL. Status at Progress Note: progressing well with bladder (40%), progressing slowly with bowel (10%)  2-8-23  Current: met 60-70% overall 3-8-23     3. Patient will demonstrate improvement of PFM strength at least 1/5 grade to improve ease with voiding and ADLs performance. Status at Progress Note: 2+/5 2-8-23  Current: progressing gradually 3-8-23     4. Patient will be able to perform side plank for 30 seconds with good form indicating improved core strength for voiding & ADLs performance. Status at Progress Note: 12 sec with right plank, 16 with Left side; progressing slowly 2-8-23  Current: met 2-22-23     5. Patient will have FOTO score for Bowel Constipation change of 11 points indicating improvement in function.   Status at Progress Note: nearly met, improved 10  2-8-23  Current: improved 7 points compared to initial assessment 3-8-23      PLAN  [x]  Upgrade activities as tolerated     [x]  Continue plan of care  []  Update interventions per flow sheet       []  Discharge due to:_  []  Other:_      Yudy Montgomery PT 3/8/2023  10:17 AM    Future Appointments   Date Time Provider Hermila Connell   3/8/2023  4:00 PM Yudy Montgomery PT [id number]   3/15/2023  5:00 PM Junie Don, PT [id number]   3/22/2023  4:00 PM Junie Don, PT [id number]   3/29/2023  4:00 PM Junie Don, PT [id number]

## 2023-03-15 ENCOUNTER — APPOINTMENT (OUTPATIENT)
Facility: HOSPITAL | Age: 38
End: 2023-03-15
Payer: COMMERCIAL

## 2023-03-22 ENCOUNTER — HOSPITAL ENCOUNTER (OUTPATIENT)
Facility: HOSPITAL | Age: 38
Setting detail: RECURRING SERIES
Discharge: HOME | End: 2023-03-25
Payer: COMMERCIAL

## 2023-03-22 PROCEDURE — 97110 THERAPEUTIC EXERCISES: CPT

## 2023-03-22 PROCEDURE — 97530 THERAPEUTIC ACTIVITIES: CPT

## 2023-03-22 NOTE — PROGRESS NOTES
PF DAILY TREATMENT NOTE (2023)      Patient Name: Erica Setting    Date: 3/22/2023    : 1985  Insurance: Payor: Lawson Matthews 150 / Plan: MARIA G BCENRIQUE FEDERAL / Product Type: *No Product type* /      Patient  verified yes     Visit #   Current / Total 1 4-8   Time   In / Out 4:07 5:03   Pain   In / Out 0/10 0/10   Subjective Functional Status/Changes: Pt reports being sick last week & coughing a lot. She notes something on her pad when coughing very hard. BM is daily, voiding/emptying is a little better. She still has some fecal matter coming out when voiding bladder. This hasn't happened as much though. Pt's able to perform PFM contraction for 15 sec. No problem with emptying/voiding bladder    Changes to:  Meds, Allergies, Med Hx, Sx Hx? If yes, update Summary List no     Timed Treatment: 56 min  1:1 Timed Treatment: 56 min    TREATMENT AREA =  Pelvic floor dysfunction [M62.89]  Difficulty in voiding [R39.198]      OBJECTIVE        25 min Therapeutic Exercise:  [] See flow sheet :   [x]  Pelvic floor strengthening                 []  Pelvic floor downtraining  []  Quality pelvic floor contractions       []  Relaxation techniques  []  Urge suppression exercises  []  Other:   Rationale: Increase pelvic floor muscle strength, Improve quality of pelvic floor contractions, Decrease resting tone of the pelvic floor, Increase tissue extensibility of the pelvic floor muscles, Increase core strength, Inhibit abnormal muscle activity, and Improve lumbosacral and coccygeal mobility in order to Improve ability to perform ADLs and void with ease .         31 min Therapeutic Activity:  []  See flow sheet :    []  Increase Tissue extensibility        [x]  Assess fiber intake    [x]  Assess voiding habits                  [x]  Assess bowel habits  [x]  Other:   Rationale: Increase pelvic floor muscle strength, Improve quality of pelvic floor contractions, Decrease resting tone of the pelvic floor, Increase tissue extensibility of

## 2023-03-29 ENCOUNTER — APPOINTMENT (OUTPATIENT)
Facility: HOSPITAL | Age: 38
End: 2023-03-29
Payer: COMMERCIAL

## 2023-04-18 ENCOUNTER — APPOINTMENT (OUTPATIENT)
Facility: HOSPITAL | Age: 38
End: 2023-04-18
Payer: COMMERCIAL

## 2023-04-28 ENCOUNTER — HOSPITAL ENCOUNTER (OUTPATIENT)
Facility: HOSPITAL | Age: 38
Setting detail: RECURRING SERIES
Discharge: HOME | End: 2023-05-01
Payer: COMMERCIAL

## 2023-04-28 PROCEDURE — 97530 THERAPEUTIC ACTIVITIES: CPT

## 2023-04-28 PROCEDURE — 97110 THERAPEUTIC EXERCISES: CPT

## 2023-05-12 ENCOUNTER — APPOINTMENT (OUTPATIENT)
Facility: HOSPITAL | Age: 38
End: 2023-05-12
Payer: COMMERCIAL

## 2023-06-02 ENCOUNTER — APPOINTMENT (OUTPATIENT)
Facility: HOSPITAL | Age: 38
End: 2023-06-02
Payer: COMMERCIAL

## (undated) DEVICE — GAUZE,SPONGE,4"X4",16PLY,STRL,LF,10/TRAY: Brand: MEDLINE

## (undated) DEVICE — SYR 50ML LR LCK 1ML GRAD NSAF --

## (undated) DEVICE — STOPCOCK 3 W OFF HNDL NYL

## (undated) DEVICE — (D)ELECTRD EMG PUDEN ST MRK -- NLM 03272013